# Patient Record
Sex: FEMALE | Race: WHITE | Employment: FULL TIME | URBAN - METROPOLITAN AREA
[De-identification: names, ages, dates, MRNs, and addresses within clinical notes are randomized per-mention and may not be internally consistent; named-entity substitution may affect disease eponyms.]

---

## 2021-08-22 ENCOUNTER — APPOINTMENT (OUTPATIENT)
Dept: GENERAL RADIOLOGY | Age: 66
End: 2021-08-22
Payer: COMMERCIAL

## 2021-08-22 ENCOUNTER — APPOINTMENT (OUTPATIENT)
Dept: CT IMAGING | Age: 66
End: 2021-08-22
Payer: COMMERCIAL

## 2021-08-22 ENCOUNTER — HOSPITAL ENCOUNTER (OUTPATIENT)
Age: 66
Setting detail: OBSERVATION
Discharge: HOME | End: 2021-08-23
Attending: INTERNAL MEDICINE | Admitting: INTERNAL MEDICINE
Payer: COMMERCIAL

## 2021-08-22 DIAGNOSIS — Z78.9 ALCOHOL USE: ICD-10-CM

## 2021-08-22 DIAGNOSIS — N17.9 AKI (ACUTE KIDNEY INJURY) (HCC): ICD-10-CM

## 2021-08-22 DIAGNOSIS — S82.831A OTHER CLOSED FRACTURE OF DISTAL END OF RIGHT FIBULA, INITIAL ENCOUNTER: Primary | ICD-10-CM

## 2021-08-22 DIAGNOSIS — R79.89 ELEVATED LACTIC ACID LEVEL: ICD-10-CM

## 2021-08-22 DIAGNOSIS — R55 SYNCOPE, UNSPECIFIED SYNCOPE TYPE: ICD-10-CM

## 2021-08-22 PROBLEM — S82.401A RIGHT FIBULAR FRACTURE: Status: ACTIVE | Noted: 2021-08-22

## 2021-08-22 PROBLEM — R10.9 ABDOMINAL PAIN: Status: ACTIVE | Noted: 2021-08-22

## 2021-08-22 PROBLEM — R74.8 ELEVATED LIVER ENZYMES: Status: ACTIVE | Noted: 2021-08-22

## 2021-08-22 LAB
ALBUMIN SERPL-MCNC: 4.4 G/DL (ref 3.5–4.6)
ALP BLD-CCNC: 81 U/L (ref 40–130)
ALT SERPL-CCNC: 43 U/L (ref 0–33)
ANION GAP SERPL CALCULATED.3IONS-SCNC: 17 MEQ/L (ref 9–15)
AST SERPL-CCNC: 86 U/L (ref 0–35)
BACTERIA: NEGATIVE /HPF
BASOPHILS ABSOLUTE: 0 K/UL (ref 0–0.2)
BASOPHILS RELATIVE PERCENT: 0.3 %
BILIRUB SERPL-MCNC: 0.4 MG/DL (ref 0.2–0.7)
BILIRUBIN URINE: NEGATIVE
BLOOD, URINE: NEGATIVE
BUN BLDV-MCNC: 35 MG/DL (ref 8–23)
CALCIUM SERPL-MCNC: 10.6 MG/DL (ref 8.5–9.9)
CHLORIDE BLD-SCNC: 99 MEQ/L (ref 95–107)
CLARITY: CLEAR
CO2: 20 MEQ/L (ref 20–31)
COLOR: YELLOW
CREAT SERPL-MCNC: 1.93 MG/DL (ref 0.5–0.9)
EOSINOPHILS ABSOLUTE: 0 K/UL (ref 0–0.7)
EOSINOPHILS RELATIVE PERCENT: 0.3 %
EPITHELIAL CELLS, UA: ABNORMAL /HPF (ref 0–5)
ETHANOL PERCENT: 0.01 G/DL
ETHANOL: 12 MG/DL (ref 0–0.08)
GFR AFRICAN AMERICAN: 31.5
GFR NON-AFRICAN AMERICAN: 26.1
GLOBULIN: 3 G/DL (ref 2.3–3.5)
GLUCOSE BLD-MCNC: 114 MG/DL (ref 70–99)
GLUCOSE URINE: NEGATIVE MG/DL
HCT VFR BLD CALC: 45.1 % (ref 37–47)
HEMOGLOBIN: 15.5 G/DL (ref 12–16)
HYALINE CASTS: ABNORMAL /LPF (ref 0–5)
KETONES, URINE: ABNORMAL MG/DL
LACTIC ACID: 1.2 MMOL/L (ref 0.5–2.2)
LACTIC ACID: 2.6 MMOL/L (ref 0.5–2.2)
LEUKOCYTE ESTERASE, URINE: NEGATIVE
LIPASE: 204 U/L (ref 12–95)
LYMPHOCYTES ABSOLUTE: 1.1 K/UL (ref 1–4.8)
LYMPHOCYTES RELATIVE PERCENT: 11 %
MAGNESIUM: 1.8 MG/DL (ref 1.7–2.4)
MCH RBC QN AUTO: 31.9 PG (ref 27–31.3)
MCHC RBC AUTO-ENTMCNC: 34.4 % (ref 33–37)
MCV RBC AUTO: 92.6 FL (ref 82–100)
MONOCYTES ABSOLUTE: 0.7 K/UL (ref 0.2–0.8)
MONOCYTES RELATIVE PERCENT: 6.8 %
NEUTROPHILS ABSOLUTE: 8 K/UL (ref 1.4–6.5)
NEUTROPHILS RELATIVE PERCENT: 81.6 %
NITRITE, URINE: NEGATIVE
PDW BLD-RTO: 12.8 % (ref 11.5–14.5)
PH UA: 5.5 (ref 5–9)
PLATELET # BLD: 177 K/UL (ref 130–400)
POTASSIUM SERPL-SCNC: 4.2 MEQ/L (ref 3.4–4.9)
PROTEIN UA: 30 MG/DL
RBC # BLD: 4.87 M/UL (ref 4.2–5.4)
RBC UA: ABNORMAL /HPF (ref 0–5)
RENAL EPITHELIAL, UA: ABNORMAL /HPF
SODIUM BLD-SCNC: 136 MEQ/L (ref 135–144)
SPECIFIC GRAVITY UA: 1.02 (ref 1–1.03)
TOTAL PROTEIN: 7.4 G/DL (ref 6.3–8)
TROPONIN: <0.01 NG/ML (ref 0–0.01)
URINE REFLEX TO CULTURE: ABNORMAL
UROBILINOGEN, URINE: 0.2 E.U./DL
WBC # BLD: 9.8 K/UL (ref 4.8–10.8)
WBC UA: ABNORMAL /HPF (ref 0–5)

## 2021-08-22 PROCEDURE — G0378 HOSPITAL OBSERVATION PER HR: HCPCS

## 2021-08-22 PROCEDURE — 74176 CT ABD & PELVIS W/O CONTRAST: CPT

## 2021-08-22 PROCEDURE — 84484 ASSAY OF TROPONIN QUANT: CPT

## 2021-08-22 PROCEDURE — 96361 HYDRATE IV INFUSION ADD-ON: CPT

## 2021-08-22 PROCEDURE — 2580000003 HC RX 258: Performed by: PERSONAL EMERGENCY RESPONSE ATTENDANT

## 2021-08-22 PROCEDURE — 81001 URINALYSIS AUTO W/SCOPE: CPT

## 2021-08-22 PROCEDURE — 73590 X-RAY EXAM OF LOWER LEG: CPT

## 2021-08-22 PROCEDURE — 82077 ASSAY SPEC XCP UR&BREATH IA: CPT

## 2021-08-22 PROCEDURE — 93005 ELECTROCARDIOGRAM TRACING: CPT | Performed by: PERSONAL EMERGENCY RESPONSE ATTENDANT

## 2021-08-22 PROCEDURE — 70450 CT HEAD/BRAIN W/O DYE: CPT

## 2021-08-22 PROCEDURE — 83690 ASSAY OF LIPASE: CPT

## 2021-08-22 PROCEDURE — 83605 ASSAY OF LACTIC ACID: CPT

## 2021-08-22 PROCEDURE — 83735 ASSAY OF MAGNESIUM: CPT

## 2021-08-22 PROCEDURE — 71045 X-RAY EXAM CHEST 1 VIEW: CPT

## 2021-08-22 PROCEDURE — 80053 COMPREHEN METABOLIC PANEL: CPT

## 2021-08-22 PROCEDURE — 99284 EMERGENCY DEPT VISIT MOD MDM: CPT

## 2021-08-22 PROCEDURE — 85025 COMPLETE CBC W/AUTO DIFF WBC: CPT

## 2021-08-22 PROCEDURE — 96360 HYDRATION IV INFUSION INIT: CPT

## 2021-08-22 PROCEDURE — 36415 COLL VENOUS BLD VENIPUNCTURE: CPT

## 2021-08-22 RX ORDER — SODIUM CHLORIDE 9 MG/ML
INJECTION, SOLUTION INTRAVENOUS CONTINUOUS
Status: DISCONTINUED | OUTPATIENT
Start: 2021-08-23 | End: 2021-08-23 | Stop reason: HOSPADM

## 2021-08-22 RX ORDER — SODIUM CHLORIDE 9 MG/ML
25 INJECTION, SOLUTION INTRAVENOUS PRN
Status: DISCONTINUED | OUTPATIENT
Start: 2021-08-22 | End: 2021-08-23 | Stop reason: HOSPADM

## 2021-08-22 RX ORDER — POLYETHYLENE GLYCOL 3350 17 G/17G
17 POWDER, FOR SOLUTION ORAL DAILY PRN
Status: DISCONTINUED | OUTPATIENT
Start: 2021-08-22 | End: 2021-08-23 | Stop reason: HOSPADM

## 2021-08-22 RX ORDER — SODIUM CHLORIDE 0.9 % (FLUSH) 0.9 %
5-40 SYRINGE (ML) INJECTION PRN
Status: DISCONTINUED | OUTPATIENT
Start: 2021-08-22 | End: 2021-08-23 | Stop reason: HOSPADM

## 2021-08-22 RX ORDER — ACETAMINOPHEN 650 MG/1
650 SUPPOSITORY RECTAL EVERY 6 HOURS PRN
Status: DISCONTINUED | OUTPATIENT
Start: 2021-08-22 | End: 2021-08-23 | Stop reason: HOSPADM

## 2021-08-22 RX ORDER — ONDANSETRON 4 MG/1
4 TABLET, ORALLY DISINTEGRATING ORAL EVERY 8 HOURS PRN
Status: DISCONTINUED | OUTPATIENT
Start: 2021-08-22 | End: 2021-08-23 | Stop reason: HOSPADM

## 2021-08-22 RX ORDER — ONDANSETRON 2 MG/ML
4 INJECTION INTRAMUSCULAR; INTRAVENOUS EVERY 6 HOURS PRN
Status: DISCONTINUED | OUTPATIENT
Start: 2021-08-22 | End: 2021-08-23 | Stop reason: HOSPADM

## 2021-08-22 RX ORDER — LISINOPRIL AND HYDROCHLOROTHIAZIDE 12.5; 1 MG/1; MG/1
2 TABLET ORAL DAILY
COMMUNITY

## 2021-08-22 RX ORDER — ACETAMINOPHEN 325 MG/1
650 TABLET ORAL EVERY 6 HOURS PRN
Status: DISCONTINUED | OUTPATIENT
Start: 2021-08-22 | End: 2021-08-23 | Stop reason: HOSPADM

## 2021-08-22 RX ORDER — FLUOXETINE HYDROCHLORIDE 20 MG/1
20 CAPSULE ORAL DAILY
COMMUNITY

## 2021-08-22 RX ORDER — SODIUM CHLORIDE 0.9 % (FLUSH) 0.9 %
5-40 SYRINGE (ML) INJECTION EVERY 12 HOURS SCHEDULED
Status: DISCONTINUED | OUTPATIENT
Start: 2021-08-23 | End: 2021-08-23 | Stop reason: HOSPADM

## 2021-08-22 RX ORDER — 0.9 % SODIUM CHLORIDE 0.9 %
1000 INTRAVENOUS SOLUTION INTRAVENOUS ONCE
Status: COMPLETED | OUTPATIENT
Start: 2021-08-22 | End: 2021-08-22

## 2021-08-22 RX ADMIN — SODIUM CHLORIDE 1000 ML: 9 INJECTION, SOLUTION INTRAVENOUS at 20:24

## 2021-08-22 ASSESSMENT — ENCOUNTER SYMPTOMS
COLOR CHANGE: 0
SORE THROAT: 0
NAUSEA: 1
COUGH: 0
ABDOMINAL PAIN: 1
VOMITING: 0
SHORTNESS OF BREATH: 0
DIARRHEA: 1
RHINORRHEA: 0
BLOOD IN STOOL: 0

## 2021-08-22 ASSESSMENT — PAIN DESCRIPTION - PAIN TYPE: TYPE: ACUTE PAIN

## 2021-08-22 ASSESSMENT — PAIN DESCRIPTION - DESCRIPTORS: DESCRIPTORS: ACHING;DULL

## 2021-08-22 ASSESSMENT — PAIN DESCRIPTION - LOCATION: LOCATION: ABDOMEN;HEAD

## 2021-08-22 ASSESSMENT — PAIN SCALES - GENERAL
PAINLEVEL_OUTOF10: 5
PAINLEVEL_OUTOF10: 4

## 2021-08-22 ASSESSMENT — PAIN DESCRIPTION - FREQUENCY: FREQUENCY: CONTINUOUS

## 2021-08-22 NOTE — ED NOTES
Bed:  [facility]  Expected date: 8/22/21  Expected time:   Means of arrival:   Comments:  60year old female with upper abd pain, also got dizzy and passed out but did not hit her [id number], 76, nsr, 16, 99% ra     Rose León RN  08/22/21 1922

## 2021-08-22 NOTE — ED TRIAGE NOTES
Pt c/o intermittent ABD pain and a syncopal episode after working today in the yard, Pt is A&OX3, calm, ambulatory, afebrile, breathes are equal and unlabored, denies ABD pain at this time

## 2021-08-22 NOTE — ED PROVIDER NOTES
[facility] ED  eMERGENCY dEPARTMENT eNCOUnter      Pt Name: Shyam Corea  MRN: [id number]  [place] 1955  Date of evaluation: 8/22/2021  Provider: Rc Fortune, [address]    Shyam Corea is a 60 y.o. female with PMHx of Hep C, mood disorder, HTN presents to the emergency department with syncope. Pt says she working outside in the heat throughout the day drinking water. She went inside and started with intermittent upper abdominal pain associated with lightheaded and nausea. She felt nauseous, went to the bathroom to vomit, and had a syncopal episode landing on her back. Denies head injuries. Thinks LOC lasted a few seconds to 1 minute. Son heard her fall and checked on her. She had diarrhea afterwards, ambulatory to the bed with out pain. She no longer has abdominal pain. Denies fevers, chills, cough, CP, SOB, vomiting. Denies ETOH or drugs. No blood thinners. HPI    Nursing Notes were reviewed. REVIEW OF SYSTEMS       Review of Systems   Constitutional: Negative for appetite change, chills and fever. HENT: Negative for congestion, rhinorrhea and sore throat. Respiratory: Negative for cough and shortness of breath. Cardiovascular: Negative for chest pain. Gastrointestinal: Positive for abdominal pain, diarrhea and nausea. Negative for blood in stool and vomiting. Genitourinary: Negative for difficulty urinating. Musculoskeletal: Negative for neck stiffness. Skin: Negative for color change and rash. Neurological: Positive for syncope and light-headedness. Negative for dizziness, weakness, numbness and headaches. All other systems reviewed and are negative. PAST MEDICAL HISTORY     Past Medical History:   Diagnosis Date    Other specified episodic mood disorder     Unspecified viral hepatitis C without hepatic coma          SURGICAL HISTORY     History reviewed. No pertinent surgical history.       CURRENT MEDICATIONS Previous Medications    DICLOFENAC (VOLTAREN) 75 MG EC TABLET    Take 1 tablet by mouth 2 times daily (with meals) for 123 days. LISINOPRIL (PRINIVIL) 10 MG TABLET    Take 1 tablet by mouth 2 times daily. ALLERGIES     Patient has no known allergies. FAMILY HISTORY       Family History   Problem Relation Age of Onset    Stroke Mother     High Blood Pressure Mother     Heart Failure Mother     Colon Cancer Mother     Stroke Father     Prostate Cancer Father     Mult Sclerosis Sister     Diabetes Other           SOCIAL HISTORY       Social History     Socioeconomic History    Marital status: Single     Spouse name: None    Number of children: None    Years of education: None    Highest education level: None   Occupational History    Occupation: Registered Nurse   Tobacco Use    Smoking status: Former Smoker    Smokeless tobacco: Never Used   Substance and Sexual Activity    Alcohol use: Yes    Drug use: Never    Sexual activity: None   Other Topics Concern    None   Social History Narrative    None     Social Determinants of Health     Financial Resource Strain:     Difficulty of Paying Living Expenses:    Food Insecurity:     Worried About Running Out of Food in the Last Year:     [address] in the Last Year:    Transportation Needs:     Lack of Transportation (Medical):      Lack of Transportation (Non-Medical):    Physical Activity:     Days of Exercise per Week:     Minutes of Exercise per Session:    Stress:     Feeling of Stress :    Social Connections:     Frequency of Communication with Friends and Family:     Frequency of Social Gatherings with Friends and Family:     Attends Bahai Services:     Active Member of Clubs or Organizations:     Attends Club or Organization Meetings:     Marital Status:    Intimate Partner Violence:     Fear of Current or Ex-Partner:     Emotionally Abused:     Physically Abused:     Sexually Abused:          PHYSICAL EXAM         ED Triage Vitals [08/22/21 1923]   BP Temp Temp Source Pulse Resp SpO2 Height Weight   112/76 97.2 °F (36.2 °C) Oral 71 16 100 % 5' 8\" (1.727 m) 200 lb (90.7 kg)       Physical Exam  Constitutional:       Appearance: She is well-developed. HENT:      Head: Normocephalic and atraumatic. Eyes:      Conjunctiva/sclera: Conjunctivae normal.      Pupils: Pupils are equal, round, and reactive to light. Neck:      Trachea: No tracheal deviation. Cardiovascular:      Heart sounds: Normal heart sounds. Pulmonary:      Effort: Pulmonary effort is normal. No respiratory distress. Breath sounds: Normal breath sounds. No stridor. Abdominal:      General: Bowel sounds are normal. There is no distension. Palpations: Abdomen is soft. There is no mass. Tenderness: There is no abdominal tenderness. There is no guarding or rebound. Musculoskeletal:         General: Normal range of motion. Cervical back: Normal range of motion and neck supple. Skin:     General: Skin is warm and dry. Capillary Refill: Capillary refill takes less than 2 seconds. Findings: No rash. Neurological:      Mental Status: She is alert and oriented to person, place, and time. Deep Tendon Reflexes: Reflexes are normal and symmetric. Psychiatric:         Behavior: Behavior normal.         Thought Content:  Thought content normal.         Judgment: Judgment normal.         DIAGNOSTIC RESULTS     EKG:All EKG's are interpreted by the Emergency Department Physician who either signs or Co-signs this chart in the absence of a cardiologist.    Normal sinus rhythm, rate 69, normal intervals, normal axis, no ST segment changes    RADIOLOGY:   Non-plain film images such as CT, Ultrasound and MRI are read by theradiologist. Plain radiographic images are visualized and preliminarily interpreted by the emergency physician with the below findings:    Interpretation per theRadiologist below, if available at the time of this note:    XR CHEST PORTABLE    (Results Pending)   CT Head WO Contrast    (Results Pending)   XR TIBIA FIBULA RIGHT (2 VIEWS)    (Results Pending)   CT ABDOMEN PELVIS WO CONTRAST Additional Contrast? None    (Results Pending)           LABS:  Labs Reviewed   COMPREHENSIVE METABOLIC PANEL - Abnormal; Notable for the following components:       Result Value    Anion Gap 17 (*)     Glucose 114 (*)     BUN 35 (*)     CREATININE 1.93 (*)     GFR Non- 26.1 (*)     GFR  31.5 (*)     Calcium 10.6 (*)     ALT 43 (*)     AST 86 (*)     All other components within normal limits   CBC WITH AUTO DIFFERENTIAL - Abnormal; Notable for the following components:    MCH 31.9 (*)     Neutrophils Absolute 8.0 (*)     All other components within normal limits   LACTIC ACID, PLASMA - Abnormal; Notable for the following components:    Lactic Acid 2.6 (*)     All other components within normal limits   LIPASE - Abnormal; Notable for the following components:    Lipase 204 (*)     All other components within normal limits   MAGNESIUM   TROPONIN   ETHANOL   URINE RT REFLEX TO CULTURE   LACTIC ACID, PLASMA       All other labs were within normal range or not returned as of this dictation. EMERGENCY DEPARTMENT COURSE and DIFFERENTIAL DIAGNOSIS/MDM:   Vitals:    Vitals:    08/22/21 1923 08/22/21 2125   BP: 112/76 121/76   Pulse: 71 76   Resp: 16 16   Temp: 97.2 °F (36.2 °C)    TempSrc: Oral    SpO2: 100% 99%   Weight: 200 lb (90.7 kg)    Height: 5' 8\" (1.727 m)          MDM    Chest xray shows no acute process. Right tib-fib x-ray shows fracture of distal fibula. BUN 35, creatinine 1.93 (0.88 January 2020),  lactic acid 2.6, lipase 204, ALT 43, AST 86, ethanol 12. Pt given 1L IVF with repeat lactic acid ordered. CT head shows no acute process. CT abdomen and pelvis shows common bile duct dilated at 12 mm which may be postsurgical, prior cholecystectomy . Calcified granuloma in the liver.  Pt on reassessment states she feels much better. She has had no abdominal pain while in the ER, no further lightheadedness. CRITICAL CARE TIME   Total Critical Caretime was 0 minutes, excluding separately reportable procedures. There was a high probability of clinically significant/life threatening deterioration in the patient's condition which required my urgent intervention. Procedures    FINAL IMPRESSION      1. Other closed fracture of distal end of right fibula, initial encounter    2. Syncope, unspecified syncope type    3. NATHANIEL (acute kidney injury) ([facility] [address]    4. Alcohol use    5. Elevated lactic acid level          DISPOSITION/PLAN   DISPOSITION Decision To Admit 08/22/2021 09:56:37 PM      PATIENT REFERRED TO:  No follow-up provider specified. DISCHARGE MEDICATIONS:  New Prescriptions    No medications on file          (Please notethat portions of this note were completed with a voice recognition program.  Efforts were made to edit the dictations but occasionally words are mis-transcribed. )    ANUJ Muro (electronically signed)  Emergency Physician Assistant         Barbara Stevens, [place]  08/22/21 [address] Edd Ellison [place]  08/22/21 8735

## 2021-08-23 ENCOUNTER — APPOINTMENT (OUTPATIENT)
Dept: ULTRASOUND IMAGING | Age: 66
End: 2021-08-23
Payer: COMMERCIAL

## 2021-08-23 VITALS
TEMPERATURE: 97.7 F | WEIGHT: 200 LBS | OXYGEN SATURATION: 96 % | RESPIRATION RATE: 18 BRPM | DIASTOLIC BLOOD PRESSURE: 61 MMHG | SYSTOLIC BLOOD PRESSURE: 105 MMHG | BODY MASS INDEX: 30.31 KG/M2 | HEART RATE: 67 BPM | HEIGHT: 68 IN

## 2021-08-23 LAB
ALBUMIN SERPL-MCNC: 4 G/DL (ref 3.5–4.6)
ALP BLD-CCNC: 74 U/L (ref 40–130)
ALT SERPL-CCNC: 35 U/L (ref 0–33)
ANION GAP SERPL CALCULATED.3IONS-SCNC: 13 MEQ/L (ref 9–15)
AST SERPL-CCNC: 40 U/L (ref 0–35)
BASOPHILS ABSOLUTE: 0 K/UL (ref 0–0.2)
BASOPHILS RELATIVE PERCENT: 0.4 %
BILIRUB SERPL-MCNC: 0.4 MG/DL (ref 0.2–0.7)
BUN BLDV-MCNC: 37 MG/DL (ref 8–23)
CALCIUM SERPL-MCNC: 9.4 MG/DL (ref 8.5–9.9)
CHLORIDE BLD-SCNC: 103 MEQ/L (ref 95–107)
CO2: 23 MEQ/L (ref 20–31)
CREAT SERPL-MCNC: 1.48 MG/DL (ref 0.5–0.9)
EKG ATRIAL RATE: 65 BPM
EKG ATRIAL RATE: 69 BPM
EKG P AXIS: 68 DEGREES
EKG P AXIS: 74 DEGREES
EKG P-R INTERVAL: 178 MS
EKG P-R INTERVAL: 188 MS
EKG Q-T INTERVAL: 396 MS
EKG Q-T INTERVAL: 436 MS
EKG QRS DURATION: 70 MS
EKG QRS DURATION: 84 MS
EKG QTC CALCULATION (BAZETT): 424 MS
EKG QTC CALCULATION (BAZETT): 453 MS
EKG R AXIS: 47 DEGREES
EKG R AXIS: 76 DEGREES
EKG T AXIS: 38 DEGREES
EKG T AXIS: 68 DEGREES
EKG VENTRICULAR RATE: 65 BPM
EKG VENTRICULAR RATE: 69 BPM
EOSINOPHILS ABSOLUTE: 0.1 K/UL (ref 0–0.7)
EOSINOPHILS RELATIVE PERCENT: 0.8 %
GFR AFRICAN AMERICAN: 29
GFR AFRICAN AMERICAN: 42.8
GFR NON-AFRICAN AMERICAN: 24
GFR NON-AFRICAN AMERICAN: 35.4
GLOBULIN: 2.4 G/DL (ref 2.3–3.5)
GLUCOSE BLD-MCNC: 92 MG/DL (ref 70–99)
HAV IGM SER IA-ACNC: ABNORMAL
HCT VFR BLD CALC: 41.9 % (ref 37–47)
HEMOGLOBIN: 14 G/DL (ref 12–16)
HEPATITIS B CORE IGM ANTIBODY: ABNORMAL
HEPATITIS B SURFACE ANTIGEN INTERPRETATION: ABNORMAL
HEPATITIS C ANTIBODY INTERPRETATION: REACTIVE
HEPATITIS INTERPRETATION:: ABNORMAL
LYMPHOCYTES ABSOLUTE: 1.9 K/UL (ref 1–4.8)
LYMPHOCYTES RELATIVE PERCENT: 24.3 %
MCH RBC QN AUTO: 31.4 PG (ref 27–31.3)
MCHC RBC AUTO-ENTMCNC: 33.5 % (ref 33–37)
MCV RBC AUTO: 94 FL (ref 82–100)
MONOCYTES ABSOLUTE: 1 K/UL (ref 0.2–0.8)
MONOCYTES RELATIVE PERCENT: 12.7 %
NEUTROPHILS ABSOLUTE: 4.9 K/UL (ref 1.4–6.5)
NEUTROPHILS RELATIVE PERCENT: 61.8 %
PDW BLD-RTO: 12.8 % (ref 11.5–14.5)
PERFORMED ON: ABNORMAL
PLATELET # BLD: 186 K/UL (ref 130–400)
POC CREATININE: 2.1 MG/DL (ref 0.6–1.2)
POC SAMPLE TYPE: ABNORMAL
POTASSIUM REFLEX MAGNESIUM: 3.6 MEQ/L (ref 3.4–4.9)
RBC # BLD: 4.46 M/UL (ref 4.2–5.4)
SODIUM BLD-SCNC: 139 MEQ/L (ref 135–144)
TOTAL PROTEIN: 6.4 G/DL (ref 6.3–8)
TROPONIN: <0.01 NG/ML (ref 0–0.01)
TROPONIN: <0.01 NG/ML (ref 0–0.01)
WBC # BLD: 8 K/UL (ref 4.8–10.8)

## 2021-08-23 PROCEDURE — 97162 PT EVAL MOD COMPLEX 30 MIN: CPT

## 2021-08-23 PROCEDURE — 76705 ECHO EXAM OF ABDOMEN: CPT

## 2021-08-23 PROCEDURE — 85025 COMPLETE CBC W/AUTO DIFF WBC: CPT

## 2021-08-23 PROCEDURE — 93010 ELECTROCARDIOGRAM REPORT: CPT | Performed by: INTERNAL MEDICINE

## 2021-08-23 PROCEDURE — 97166 OT EVAL MOD COMPLEX 45 MIN: CPT

## 2021-08-23 PROCEDURE — 84484 ASSAY OF TROPONIN QUANT: CPT

## 2021-08-23 PROCEDURE — 36415 COLL VENOUS BLD VENIPUNCTURE: CPT

## 2021-08-23 PROCEDURE — 80053 COMPREHEN METABOLIC PANEL: CPT

## 2021-08-23 PROCEDURE — 6370000000 HC RX 637 (ALT 250 FOR IP): Performed by: NURSE PRACTITIONER

## 2021-08-23 PROCEDURE — G0378 HOSPITAL OBSERVATION PER HR: HCPCS

## 2021-08-23 PROCEDURE — 2580000003 HC RX 258: Performed by: NURSE PRACTITIONER

## 2021-08-23 PROCEDURE — 80074 ACUTE HEPATITIS PANEL: CPT

## 2021-08-23 RX ORDER — DEXTROAMPHETAMINE SACCHARATE, AMPHETAMINE ASPARTATE MONOHYDRATE, DEXTROAMPHETAMINE SULFATE AND AMPHETAMINE SULFATE 5; 5; 5; 5 MG/1; MG/1; MG/1; MG/1
20 CAPSULE, EXTENDED RELEASE ORAL DAILY PRN
COMMUNITY
Start: 2021-07-15

## 2021-08-23 RX ORDER — MULTIVITAMIN WITH IRON
500 TABLET ORAL PRN
COMMUNITY

## 2021-08-23 RX ORDER — OMEGA-3 FATTY ACIDS/FISH OIL 300-1000MG
1-2 CAPSULE ORAL PRN
COMMUNITY

## 2021-08-23 RX ADMIN — IBUPROFEN 600 MG: 200 TABLET, FILM COATED ORAL at 03:04

## 2021-08-23 RX ADMIN — SODIUM CHLORIDE: 9 INJECTION, SOLUTION INTRAVENOUS at 02:05

## 2021-08-23 ASSESSMENT — PAIN DESCRIPTION - DESCRIPTORS: DESCRIPTORS: ACHING

## 2021-08-23 ASSESSMENT — PAIN DESCRIPTION - ORIENTATION
ORIENTATION: RIGHT
ORIENTATION: RIGHT

## 2021-08-23 ASSESSMENT — ENCOUNTER SYMPTOMS
SHORTNESS OF BREATH: 0
COUGH: 0
DIARRHEA: 1
NAUSEA: 1
TROUBLE SWALLOWING: 0
CHEST TIGHTNESS: 0
WHEEZING: 0
VOMITING: 0
SORE THROAT: 0
ABDOMINAL PAIN: 1
CONSTIPATION: 0
EYES NEGATIVE: 1

## 2021-08-23 ASSESSMENT — PAIN DESCRIPTION - LOCATION
LOCATION: ANKLE
LOCATION: ANKLE

## 2021-08-23 ASSESSMENT — PAIN SCALES - GENERAL
PAINLEVEL_OUTOF10: 5
PAINLEVEL_OUTOF10: 2
PAINLEVEL_OUTOF10: 2

## 2021-08-23 ASSESSMENT — PAIN DESCRIPTION - PAIN TYPE: TYPE: ACUTE PAIN

## 2021-08-23 NOTE — PROGRESS NOTES
Physical Therapy  Facility/Department: [facility] GUVY I043/D765-05      PT evaluation attempted 8/23/21, at 12:10 PM EDT, however this patient status is currently observation. Per facility protocol, PT evaluation and treatment orders for patients in observation status must include a PT specific diagnosis (i.e. \"difficulty ambulating\", \"lack of coordination\", etc.) These order specifications may be added to the \"comments\" section of the PT evaluation and treatment order. Requested updated Physical Therapy orders from referring provider via \"sticky note\". Coordination team notified.      We thank you for your referral!    [facility]) Physical Therapy Department    Electronically signed by Heidi Perla PT on 8/23/21 at 12:10 PM EDT

## 2021-08-23 NOTE — PROGRESS NOTES
Patient is alert and oriented. She has mild pain in her right lower leg and a headache. Right leg is in an air cast and leg is swollen and slightly bruised. Patient fell at home and believes she hit head also. There is bruising and swelling above her right eye. Patient has no neuro deficits and does not complain of any dizziness at this time.

## 2021-08-23 NOTE — H&P
[facility]  MEDICINE    HISTORY AND PHYSICAL EXAM    PATIENT NAME:  Geovani Wetzel    MRN:  [id number]  SERVICE DATE:  8/22/2021   SERVICE TIME:  11:33 PM    Primary Care Physician: Eboni Wolff MD     SUBJECTIVE  CHIEF COMPLAINT:  Syncopal episode    HPI:  Geovani Wetzel is a 60 y.o., , female who  has a past medical history of Other specified episodic mood disorder and Unspecified viral hepatitis C without hepatic coma. that is hospitalized for syncope, fracture right fibula, NATHANIEL. HPI   Presents after syncopal episode. Describes working outside all day, came in to house,  \"chugged a water, took a shower\"   Had sudden onset upper abdominal pain - \"felt like a belt squeezing me so hard\"  Accompanied by nausea and lightheadedness. She felt she would vomit, walked into bathroom and passed out. She reports one episode diarrhea at that time as well. She fell onto her back, did not hit head. She had xray here that showed fracture of right distal end of fibula. She feels she hydrates adequately but admits to being very hot working outside,  Did have coffee in AM and alcohol last PM.   She is admitted for further eval and treatment of syncope, NATHANIEL, fracture. PAST MEDICAL HISTORY:    Past Medical History:   Diagnosis Date    Other specified episodic mood disorder     Unspecified viral hepatitis C without hepatic coma      PAST SURGICAL HISTORY:  History reviewed. No pertinent surgical history.   FAMILY HISTORY:    Family History   Problem Relation Age of Onset    Stroke Mother     High Blood Pressure Mother     Heart Failure Mother     Colon Cancer Mother     Stroke Father     Prostate Cancer Father     Mult Sclerosis Sister     Diabetes Other      SOCIAL HISTORY:    Social History     Socioeconomic History    Marital status: Single     Spouse name: Not on file    Number of children: Not on file    Years of education: Not on file    Highest education level: Not on file Occupational History    Occupation: Registered Nurse   Tobacco Use    Smoking status: Former Smoker    Smokeless tobacco: Never Used   Substance and Sexual Activity    Alcohol use: Yes    Drug use: Never    Sexual activity: Not on file   Other Topics Concern    Not on file   Social History Narrative    Not on file     Social Determinants of Health     Financial Resource Strain:     Difficulty of Paying Living Expenses:    Food Insecurity:     Worried About Running Out of Food in the Last Year:     [address] in the Last Year:    Transportation Needs:     Lack of Transportation (Medical):  Lack of Transportation (Non-Medical):    Physical Activity:     Days of Exercise per Week:     Minutes of Exercise per Session:    Stress:     Feeling of Stress :    Social Connections:     Frequency of Communication with Friends and Family:     Frequency of Social Gatherings with Friends and Family:     Attends Hoahaoism Services:     Active Member of Clubs or Organizations:     Attends Club or Organization Meetings:     Marital Status:    Intimate Partner Violence:     Fear of Current or Ex-Partner:     Emotionally Abused:     Physically Abused:     Sexually Abused:      MEDICATIONS:   Prior to Admission medications    Medication Sig Start Date End Date Taking? Authorizing Provider   lisinopril (PRINIVIL) 10 MG tablet Take 1 tablet by mouth 2 times daily. 2/14/13 2/14/14  Manny Lebron MD   diclofenac (VOLTAREN) 75 MG EC tablet Take 1 tablet by mouth 2 times daily (with meals) for 123 days. 7/1/12 11/1/12  Manny Lebron MD       ALLERGIES: Patient has no known allergies. REVIEW OF SYSTEM:   Review of Systems   Constitutional: Negative for chills, diaphoresis, fatigue and fever. HENT: Negative for sore throat and trouble swallowing. Eyes: Negative. Respiratory: Negative for cough, chest tightness, shortness of breath and wheezing.     Cardiovascular: Negative for chest pain, palpitations and leg swelling. Gastrointestinal: Positive for abdominal pain, diarrhea and nausea. Negative for constipation and vomiting. Endocrine: Negative. Genitourinary: Negative for dysuria, flank pain and urgency. Musculoskeletal: Negative. Right ankle pain   Skin: Negative. Neurological: Positive for syncope and light-headedness. Negative for dizziness, speech difficulty, weakness, numbness and headaches. Psychiatric/Behavioral: Negative. Negative for confusion. The patient is not nervous/anxious. OBJECTIVE  PHYSICAL EXAM:   Physical Exam  Vitals and nursing note reviewed. Constitutional:       General: She is awake. She is not in acute distress. Appearance: Normal appearance. She is obese. She is not ill-appearing or diaphoretic. HENT:      Head: Normocephalic and atraumatic. Nose: Nose normal.      Mouth/Throat:      Pharynx: Oropharynx is clear. Eyes:      Conjunctiva/sclera: Conjunctivae normal.   Neck:      Vascular: No carotid bruit. Cardiovascular:      Rate and Rhythm: Normal rate and regular rhythm. Pulses: Normal pulses. Heart sounds: Normal heart sounds. No murmur heard. Pulmonary:      Effort: Pulmonary effort is normal.      Breath sounds: Normal breath sounds. No wheezing or rhonchi. Abdominal:      General: Abdomen is flat. Bowel sounds are normal.      Palpations: Abdomen is soft. Tenderness: There is no abdominal tenderness. Musculoskeletal:         General: Normal range of motion. Cervical back: No muscular tenderness. Right lower leg: No edema. Left lower leg: No edema. Lymphadenopathy:      Cervical: No cervical adenopathy. Skin:     General: Skin is warm and dry. Capillary Refill: Capillary refill takes less than 2 seconds. Neurological:      Mental Status: She is alert and oriented to person, place, and time.    Psychiatric:         Mood and Affect: Mood normal.         Behavior: Behavior normal. Behavior is cooperative. /67   Pulse 70   Temp 97.7 °F (36.5 °C)   Resp 18   Ht 5' 8\" (1.727 m)   Wt 200 lb (90.7 kg)   LMP  (LMP Unknown)   SpO2 96%   BMI 30.41 kg/m²     DATA:     Diagnostic tests reviewed for today's visit:    Most recent labs and imaging results reviewed.      LABS:    Recent Results (from the past 24 hour(s))   Comprehensive Metabolic Panel    Collection Time: 08/22/21  7:45 PM   Result Value Ref Range    Sodium 136 135 - 144 mEq/L    Potassium 4.2 3.4 - 4.9 mEq/L    Chloride 99 95 - 107 mEq/L    CO2 20 20 - 31 mEq/L    Anion Gap 17 (H) 9 - 15 mEq/L    Glucose 114 (H) 70 - 99 mg/dL    BUN 35 (H) 8 - 23 mg/dL    CREATININE 1.93 (H) 0.50 - 0.90 mg/dL    GFR Non-African American 26.1 (L) >60    GFR  31.5 (L) >60    Calcium 10.6 (H) 8.5 - 9.9 mg/dL    Total Protein 7.4 6.3 - 8.0 g/dL    Albumin 4.4 3.5 - 4.6 g/dL    Total Bilirubin 0.4 0.2 - 0.7 mg/dL    Alkaline Phosphatase 81 40 - 130 U/L    ALT 43 (H) 0 - 33 U/L    AST 86 (H) 0 - 35 U/L    Globulin 3.0 2.3 - 3.5 g/dL   CBC Auto Differential    Collection Time: 08/22/21  7:45 PM   Result Value Ref Range    WBC 9.8 4.8 - 10.8 K/uL    RBC 4.87 4.20 - 5.40 M/uL    Hemoglobin 15.5 12.0 - 16.0 g/dL    Hematocrit 45.1 37.0 - 47.0 %    MCV 92.6 82.0 - 100.0 fL    MCH 31.9 (H) 27.0 - 31.3 pg    MCHC 34.4 33.0 - 37.0 %    RDW 12.8 11.5 - 14.5 %    Platelets 061 414 - 425 K/uL    Neutrophils % 81.6 %    Lymphocytes % 11.0 %    Monocytes % 6.8 %    Eosinophils % 0.3 %    Basophils % 0.3 %    Neutrophils Absolute 8.0 (H) 1.4 - 6.5 K/uL    Lymphocytes Absolute 1.1 1.0 - 4.8 K/uL    Monocytes Absolute 0.7 0.2 - 0.8 K/uL    Eosinophils Absolute 0.0 0.0 - 0.7 K/uL    Basophils Absolute 0.0 0.0 - 0.2 K/uL   Lactic Acid, Plasma    Collection Time: 08/22/21  7:45 PM   Result Value Ref Range    Lactic Acid 2.6 (H) 0.5 - 2.2 mmol/L   Magnesium    Collection Time: 08/22/21  7:45 PM   Result Value Ref Range    Magnesium 1.8 1.7 - 2.4 mg/dL   Troponin    Collection Time: 08/22/21  7:45 PM   Result Value Ref Range    Troponin <0.010 0.000 - 0.010 ng/mL   Ethanol    Collection Time: 08/22/21  7:45 PM   Result Value Ref Range    Ethanol Lvl 12 mg/dL    Ethanol percent 0.011 G/dL   Lipase    Collection Time: 08/22/21  7:45 PM   Result Value Ref Range    Lipase 204 (H) 12 - 95 U/L   EKG 12 Lead - Chest Pain    Collection Time: 08/22/21  7:53 PM   Result Value Ref Range    Ventricular Rate 69 BPM    Atrial Rate 69 BPM    P-R Interval 188 ms    QRS Duration 70 ms    Q-T Interval 396 ms    QTc Calculation (Bazett) 424 ms    P Axis 68 degrees    R Axis 47 degrees    T Axis 38 degrees   Urine Reflex to Culture    Collection Time: 08/22/21  9:00 PM    Specimen: Urine, clean catch   Result Value Ref Range    Color, UA Yellow Straw/Yellow    Clarity, UA Clear Clear    Glucose, Ur Negative Negative mg/dL    Bilirubin Urine Negative Negative    Ketones, Urine TRACE (A) Negative mg/dL    Specific Gravity, UA 1.019 1.005 - 1.030    Blood, Urine Negative Negative    pH, UA 5.5 5.0 - 9.0    Protein, UA 30 (A) Negative mg/dL    Urobilinogen, Urine 0.2 <2.0 E.U./dL    Nitrite, Urine Negative Negative    Leukocyte Esterase, Urine Negative Negative    Urine Reflex to Culture Not Indicated    Lactic Acid, Plasma    Collection Time: 08/22/21  9:00 PM   Result Value Ref Range    Lactic Acid 1.2 0.5 - 2.2 mmol/L   Microscopic Urinalysis    Collection Time: 08/22/21  9:00 PM   Result Value Ref Range    Hyaline Casts, UA 5-10 (A) 0 - 5 /LPF    Renal Epithelial, UA 0-2 (A) /HPF    Bacteria, UA Negative Negative /HPF    WBC, UA 6-9 (A) 0 - 5 /HPF    RBC, UA 3-5 (A) 0 - 5 /HPF    Epithelial Cells, UA 3-5 0 - 5 /HPF       IMAGING:  No results found. VTE Prophylaxis: low molecular weight heparin -  start    ASSESSMENT AND PLAN  Principal Problem:    Syncope and collapse   As above, worked outside, overheated, likely dehydrated.    Syncope resulted in distal fib fracture on right. No HA, dizziness. Reported loss of consciousness < 1 min. NATHANIEL. Plan: admit  Hydrate. Active Problems:    NATHANIEL   Likely result of dehydration given overheated working outside, poor PO intake, caffeine, HCTZ,  ETOH. Plan: hydrate   Monitor labs  Consult nephrology if no improvement. Evaluate choice of antihypertensive. Essential hypertension, benign  Takes lisinopril/hctz   /76  No HA, blurred vision dizziness. Had syncope earlier today most likely related to dehydraton. Plan: monitor   Restart meds as tolerated  Will hold for now. Right fibular fracture  After fall in bathroom   Mild to mod pain at joint    Toes warm,  Good pulse. splint in place. Plan: ortho consult. Abdominal pain  Sudden onset  She describes as severe - like a belt squeezing tightly around upper abdomen. Had nausea, no vomiting. Plan: liver US.  (s/p amita)     Elevated liver enzymes   ALT 43, AST 86   Lipase 204. CT abdomen - report pending but reportedly no suspicion of pancreatitis. Plan: hepatitis panel,  US liver.          Plan of care discussed with: patient    SIGNATURE: ARCHANA Rodriguez - CNP  DATE: August 22, 2021  TIME: 11:33 PM   Panda Gonzalez MD  - supervising physician

## 2021-08-23 NOTE — PROGRESS NOTES
Physical Therapy Med Surg Initial Assessment  Facility/Department: [facility]  Room: [id number]       NAME: Brittani Wise  : 1955 (60 y.o.)  MRN: [id number]  CODE STATUS: Full Code    Date of Service: 2021    Patient Diagnosis(es): Syncope and collapse [R55]  NATHANIEL (acute kidney injury) ([facility] [address] [N17.9]  Elevated lactic acid level [R79.89]  Syncope, unspecified syncope type [R55]  Other closed fracture of distal end of right fibula, initial encounter [S82.831A]  Alcohol use [Z72.89]   Chief Complaint   Patient presents with    Loss of Consciousness     pt c/o a syncopal episode and ABD pain after working the yard today     Patient Active Problem List    Diagnosis Date Noted    Syncope and collapse 2021    Right fibular fracture 2021    Elevated liver enzymes 2021    Abdominal pain 2021    NATHANIEL (acute kidney injury) ([facility] [address] 2021    Patient overweight 2012    Fatigue 2012    Hep C w/o coma, chronic ([facility] [address] 2012    Hepatitis C 2011    Essential hypertension, benign 2011    Esophageal reflux 2011        Past Medical History:   Diagnosis Date    GERD (gastroesophageal reflux disease)     Hypertension     Other specified episodic mood disorder     Unspecified viral hepatitis C without hepatic coma      Past Surgical History:   Procedure Laterality Date    CHOLECYSTECTOMY      SKIN GRAFT      left arm/elbow       Chart Reviewed: Yes  Patient assessed for rehabilitation services?: Yes  Family / Caregiver Present: No    Restrictions:  Restrictions/Precautions: Weight Bearing, Fall Risk (high duarte score)  Lower Extremity Weight Bearing Restrictions  Right Lower Extremity Weight Bearing: Non Weight Bearing     SUBJECTIVE:      Pain  Pre Treatment Pain Screening  Pain at present: 2  Intervention List: Patient able to continue with treatment    Post Treatment Pain Screening:   Pain Screening  Patient Currently in Pain: Yes  Pain Assessment  Pain Level: 2  Pain Location: Ankle  Pain Orientation: Right    Prior Level of Function:  Social/Functional History  Lives With: Son (son can be home with pt; son in law)  Type of Home: House  Home Layout: Two level, Laundry in basement, Bed/Bath upstairs  Home Access: Stairs to enter with rails  Entrance Stairs - Number of Steps: 3 + 1  Entrance Stairs - Rails: Left  Bathroom Shower/Tub: Tub/Shower unit  ADL Assistance: Independent  Homemaking Assistance: Independent  Homemaking Responsibilities: Yes  Ambulation Assistance: Independent  Transfer Assistance: Independent  Active : Yes  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; clinicals 2xs/wk  [address]: watch tv, gardening    OBJECTIVE:   Vision: Within Functional Limits  Hearing: Within functional limits    Cognition:  Overall Orientation Status: Within Normal Limits  Follows Commands: Within Functional Limits    Observation/Palpation  Observation: Pt alert and attentive, splinted on RLE    ROM:  RLE AROM: Exceptions (WFL except R ankle with splint in place)  LLE AROM : WFL    Strength:  Strength RLE  Comment: grossly 4/5  Strength LLE  Comment: grossly 4/5    Neuro:  Balance  Posture: Fair  Sitting - Static: Good  Sitting - Dynamic: Good  Standing - Static: Fair;+ (mod increased sway with SLS L during hand washing without UE support)  Standing - Dynamic: Fair;- (requires B UE support for steadiness)     Tone RLE  RLE Tone: Normotonic  Tone LLE  LLE Tone: Normotonic  Motor Control  Gross Motor?: WFL  Sensation  Overall Sensation Status: WFL    Bed mobility  Supine to Sit: Supervision  Sit to Supine:  (NT- pt left up in bedside chair)    Transfers  Sit to Stand: Contact guard assistance  Stand to sit: Contact guard assistance  Comment: [employer]; max cues for technique with NWBing R LE    Ambulation  Ambulation?: Yes  Ambulation 1  Surface: level tile  Device: Rolling Walker  Assistance: Contact guard assistance  Quality of Gait: hopping gait pattern  Distance: 20 ft  Comments: pt unsteady; indep with current weight bearing status after cues    Stairs/Curb  Stairs?: No (pt edu in stair negotiation on buttocks- pt stated understanding)         Activity Tolerance  Activity Tolerance: Patient limited by endurance  Activity Tolerance: Pt limited by unsteadiness and new weight bearing status R LE          PT Education  PT Education: Goals;PT Role;Plan of Care;General Safety;Weight-bearing Education; Functional Mobility Training;Transfer Training;Gait Training    ASSESSMENT:   Body structures, Functions, Activity limitations: Decreased functional mobility ; Decreased balance;Decreased ROM; Decreased endurance; Increased pain;Decreased strength  Decision Making: Medium Complexity  History: med  Exam: med  Clinical Presentation: med    Prognosis: Good    DISCHARGE RECOMMENDATIONS:  Discharge Recommendations: Continue to assess pending progress    Assessment: Pt with new weight bearing status of NWBing R LE. Pt exhibits decreased strength and balance to safely complete NWBing R LE with indep at this time, requiring 1 person assistance for pt safety. Continued PT is required for safe d/c home and allow pt to go to upstairs bedroom and bathroom. REQUIRES PT FOLLOW UP: Yes      PLAN OF CARE:  Plan  Times per week: 3-6  Current Treatment Recommendations: Strengthening, Functional Mobility Training, Wheelchair Mobility Training, Neuromuscular Re-education, Home Exercise Program, Equipment Evaluation, Education, & procurement, ROM, Transfer Training, Gait Training, Safety Education & Training, Balance Training, Endurance Training, Stair training, Pain Management, Patient/Caregiver Education & Training, Positioning  Safety Devices  Type of devices: Call light within reach, Chair alarm in place, Left in chair    Goals:  Short term goals  Short term goal 1: Pt will demonstrate HEP indep.   Long term goals  Long term goal 1: Pt will demonstrate bed mobility indep for safe return home. Long term goal 2: Pt will demonstrate transfers mod indep with WW with 100% compliance with NWBing R LE to allow pt to use commode at home. Long term goal 3: Pt will demosntrate amb mod indep 50ft mod indep with WWwith 100% compliance with NWBing R LE to allow pt to amb to bathroom at home. Long term goal 4: Pt will demonstrate stair negotiation 1 flight maintaining NWBing R % of the time to allow pt to go upstairs to bedroom and bathroom  Long term goal 5: Pt will indep propel w/c 150ft to allow pt to demonstrate safe functional mobility inside her home. [facility] (6 CLICK) BASIC MOBILITY  AM-PAC Inpatient Mobility Raw Score : 16     Therapy Time:   Individual   Time In 1333   Time Out 1350   Minutes [address], 08/23/21 at 2:52 PM         Definitions for assistance levels  Independent = pt does not require any physical supervision or assistance from another person for activity completion. Device may be needed.   Stand by assistance = pt requires verbal cues or instructions from another person, close to but not touching, to perform the activity  Minimal assistance= pt performs 75% or more of the activity; assistance is required to complete the activity  Moderate assistance= pt performs 50% of the activity; assistance is required to complete the activity  Maximal assistance = pt performs 25% of the activity; assistance is required to complete the activity  Dependent = pt requires total physical assistance to accomplish the task

## 2021-08-23 NOTE — CARE COORDINATION
LSW discussed equipment with the pt and her son. They decided to purchase the walker today. LSW sent order for wheelchair and bed side commode to [employer] [facility] [employer] in [facility]. Those items will be delivered to the pt at her home. Pt has information

## 2021-08-23 NOTE — CONSULTS
Orthopaedic Surgery Consult Note    Chief Complaint   Patient presents with    Loss of Consciousness     pt c/o a syncopal episode and ABD pain after working the yard today      History of Present Illness: Aleta Baca is a 60 y.o. female with a history of hep C who presented to the ED last night after a syncopal fall. Patient was working in the heat for more than 5 hours. When she came back inside she drank water and took a shower. She was feeling dizzy. She started having abdominal pain in her upper abdomen under her breasts. Pain was so severe that she had to take her clothes off and go lie down. She started having nausea and on her way to the bathroom to throw up she passed out. She does not remember what happened. She woke up on the floor. She did not vomit and her nausea resolved. She was also experiencing severe pain in the right ankle with attempted ambulation. Imaging in the ED demonstrated a distal fibula fracture and orthopaedic surgery was consulted for further evaluation. She reports a previous right ankle injury that has left her ankle feeling unsteady with activity. Pain is improved at rest in the bed, denies any new onset numbness or tingling. Physical Exam:    General:  Alert and oriented x 3, NAD, well kempt, and of stated age. Vital Signs:  /61   Pulse 67   Temp 97.7 °F (36.5 °C) (Oral)   Resp 18   Ht 5' 8\" (1.727 m)   Wt 200 lb (90.7 kg)   LMP  (LMP Unknown)   SpO2 96%   BMI 30.41 kg/m²  , Body mass index is 30.41 kg/m². HEENT:  Head is normocephalic and atraumatic. Extraocular muscles are grossly intact. Neck:  Supple, no visible swelling. Cardiovascular:  Hemodynamically stable. Respiratory:  No audible wheezing, unlabored respirations. Skin:  Clean and dry, well kempt. Psychiatric:  Good affect, stable, cooperative  Right ankle: Skin intact with mild swelling. Tender to palpation at the distal fibula. No medial tenderness.  No tenderness at the fibular neck. Motor and sensory intact in the distally. Calf soft and compressible. Toes warm and well-perfused. Assessment/Plan: 60year-old female with right ankle fracture. 1. Recommend non-operative treatment at this time. Gonzalez C lateral malleolus fracture more commonly associated with concomitant ligamentous injury, and there is slight medial clear space widening although that may be residual from previous injury. Recommend stress radiographs in the office. 2. NWB  3. Ice, elevate  4. Will splint later this morning. 5. Follow-up in the orthopaedic clinic later this week. Thank you for this consultation and for allowing me to participate in the care of this patient. Past Medical History    Past Medical History:   Diagnosis Date    GERD (gastroesophageal reflux disease)     Hypertension     Other specified episodic mood disorder     Unspecified viral hepatitis C without hepatic coma        Past Surgical History    Past Surgical History:   Procedure Laterality Date    CHOLECYSTECTOMY      SKIN GRAFT      left arm/elbow       Allergies    Patient has no known allergies.     Medications      Current Facility-Administered Medications   Medication Dose Route Frequency Provider Last Rate Last Admin    sodium chloride flush 0.9 % injection 5-40 mL  5-40 mL Intravenous 2 times per day Dinah Basil, APRN - CNP        sodium chloride flush 0.9 % injection 5-40 mL  5-40 mL Intravenous PRN Dinah Basil, APRN - CNP        0.9 % sodium chloride infusion  25 mL Intravenous PRN Dinah Basil, APRN - CNP        enoxaparin (LOVENOX) injection 40 mg  40 mg Subcutaneous Daily Phillip Joyce APRN - CNP        ondansetron (ZOFRAN-ODT) disintegrating tablet 4 mg  4 mg Oral Q8H PRN Dinah Basil, APRN - CNP        Or    ondansetron [facility]) injection 4 mg  4 mg Intravenous Q6H PRN Dinah Basil, APRN - CNP        polyethylene glycol (GLYCOLAX) packet 17 g  17 g Oral Daily PRN Threasa Locks, APRN - CNP        acetaminophen (TYLENOL) tablet 650 mg  650 mg Oral Q6H PRN Threasa Locks, APRN - CNP        Or    acetaminophen (TYLENOL) suppository 650 mg  650 mg Rectal Q6H PRN Threasa Locks, APRN - CNP        0.9 % sodium chloride infusion   Intravenous Continuous Threasa Locks, APRN -  mL/hr at 08/23/21 0205 New Bag at 08/23/21 0205       Family History    Family History   Problem Relation Age of Onset    Stroke Mother     High Blood Pressure Mother     Heart Failure Mother     Colon Cancer Mother     Stroke Father     Prostate Cancer Father     Mult Sclerosis Sister     Diabetes Other        Social History:    Social History     Socioeconomic History    Marital status: Single     Spouse name: Not on file    Number of children: Not on file    Years of education: Not on file    Highest education level: Not on file   Occupational History    Occupation: Registered Nurse   Tobacco Use    Smoking status: Former Smoker    Smokeless tobacco: Never Used   Substance and Sexual Activity    Alcohol use: Yes    Drug use: Never    Sexual activity: Not on file   Other Topics Concern    Not on file   Social History Narrative    Not on file     Social Determinants of Health     Financial Resource Strain:     Difficulty of Paying Living Expenses:    Food Insecurity:     Worried About Running Out of Food in the Last Year:     [address] in the Last Year:    Transportation Needs:     Lack of Transportation (Medical):      Lack of Transportation (Non-Medical):    Physical Activity:     Days of Exercise per Week:     Minutes of Exercise per Session:    Stress:     Feeling of Stress :    Social Connections:     Frequency of Communication with Friends and Family:     Frequency of Social Gatherings with Friends and Family:     Attends Synagogue Services:     Active Member of Clubs or Organizations:     Attends Club or Organization Meetings:     Marital Status:    Intimate Partner Violence:     Fear of Current or Ex-Partner:     Emotionally Abused:     Physically Abused:     Sexually Abused:        Review of Systems:  A 14 point review of systems was completed, reviewed and placed in the patients chart. It is otherwise negative except for the pertinent information in the HPI. Constitutional: Negative for fever, appetite change and unexpected weight change. HENT: Negative for hearing loss, trouble swallowing and voice change. Eyes: Negative for redness and visual disturbance. Respiratory: Negative for cough and shortness of breath. Cardiovascular: Negative for chest pain and palpitations. Gastrointestinal: Negative for nausea, vomiting and blood in stool. Endocrine: Negative for cold intolerance and heat intolerance. Genitourinary: Negative for dysuria and hematuria. Musculoskeletal: See HPI   Skin: Negative for rash and wound. Neurological: Negative for dizziness, seizures and headaches. Hematological: Negative for adenopathy. Does not bruise/bleed easily. Psychiatric/Behavioral: Negative for behavioral problems and sleep disturbance. Test Results    CBC:   Recent Labs     08/22/21 1945 08/23/21  0350   WBC 9.8 8.0   HGB 15.5 14.0    186     BMP:    Recent Labs     08/22/21 1945 08/23/21  0350    139   K 4.2 3.6   CL 99 103   CO2 20 23   BUN 35* 37*   CREATININE 1.93* 1.48*   GLUCOSE 114* 92       EXAMINATION: XR TIBIA FIBULA RIGHT (2 VIEWS), 8/22/2021 8:33 PM        CLINICAL HISTORY:  fall, right knee pain        COMPARISON: Radiographs 5/23/2011       TECHNIQUE: 4 views of the right knee       FINDINGS:     There is a mildly displaced distal fibular diaphyseal fracture, with mild impaction. There is overlying soft tissue swelling.  Well-corticated ossific fragment at the fibular tip, remote injury.  The marginal joint spaces are preserved without significant  degenerative changes.             Impression   Mildly displaced right distal fibular diaphyseal fracture with mild impaction.           CT Head WO Contrast    Result Date: 8/23/2021  CT Brain. Contrast medium:  without contrast.. History:  Syncope. Technical factors: CT imaging of the brain was obtained and formatted as 5 mm contiguous axial images. 2.5 mm contiguous axial images were obtained through the osseous structures. Sagittal and coronal reconstruction obtained during postprocessing. Comparison:  None. Findings: Extra-axial spaces:  Normal. Intracranial hemorrhage:  None. Ventricular system: [Without anomaly.] Basal Cisterns:  Normal. Cerebral Parenchyma: [Without anomaly]. Midline Shift:  None. Cerebellum:  Normal. Paranasal sinuses and mastoid air cells:  Normal. Visualized Orbits:  Normal.     Impression: [Negative CT of the brain]. All CT scans at this facility use dose modulation, iterative reconstruction, and/or weight based dosing when appropriate to reduce radiation dose to as low as reasonably achievable. XR CHEST PORTABLE    Result Date: 8/23/2021  EXAMINATION: Portable AP ERECT view of the chest. CLINICAL HISTORY:  syncope , loss of consciousness DATE: 8/22/2021 8:33 PM COMPARISONS: 2/26/2013 FINDINGS: Normal cardiac silhouette. Lungs are clear without consolidation. No pleural effusion or pneumothorax. There are mild degenerative changes in spine. NO ACUTE CARDIOPULMONARY ABNORMALITY. NO CHANGE. US ABDOMEN LIMITED Specify organ? LIVER    Result Date: 8/23/2021  EXAMINATION: US ABDOMEN LIMITED CLINICAL HISTORY: ELEVATED LIVER FUNCTION TESTS FINDINGS: Liver normal in size, shape, and echogenicity. No intrahepatic ductal dilatation. Common duct measures 12 mm at level of portal vein and right hepatic artery. No intraluminal mass identified. Gallbladder surgically absent. Pancreatic head and body normal in size, shape, and echogenicity. Pancreatic tail obscured by overlying bowel gas. EXTRAHEPATIC DUCTAL DILATATION IN POSTCHOLECYSTECTOMY PATIENT. Problem List    Patient Active Problem List   Diagnosis    Essential hypertension, benign    Esophageal reflux    Hepatitis C    Patient overweight    Fatigue    Hep C w/o coma, chronic (HCC)    Syncope and collapse    Right fibular fracture    Elevated liver enzymes    Abdominal pain    NATHANIEL (acute kidney injury) ([facility] [address]          Note - This record has been created using [employer]. Chart creation errors have been sought, but may not always have been located. Such creation errors do not reflect on the standard of medical care.

## 2021-08-23 NOTE — PLAN OF CARE
See OT evaluation for all goals and OT POC.  Electronically signed by JACOB Jamil on 8/23/2021 at 2:16 PM

## 2021-08-23 NOTE — PROGRESS NOTES
Pain: Yes  Pain Assessment: 0-10  Pain Level: 2  Pain Type: Acute pain  Pain Location: Ankle  Pain Orientation: Right  Pain Descriptors: Aching       Prior Level of Function:  Social/Functional History  Lives With: Son (son can be home with pt; son in law)  Type of Home: House  Home Layout: Two level, Laundry in basement, Bed/Bath upstairs  Home Access: Stairs to enter with rails  Entrance Stairs - Number of Steps: 3 + 1  Entrance Stairs - Rails: Left  Bathroom Shower/Tub: Tub/Shower unit  ADL Assistance: Independent  Homemaking Assistance: Independent  Homemaking Responsibilities: Yes  Ambulation Assistance: Independent  Transfer Assistance: Independent  Active : Yes  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; clinicals 2xs/wk  Leisure & Hobbies: watch tv, gardening    OBJECTIVE:     Orientation Status:  Orientation  Overall Orientation Status: Within Functional Limits    Observation:  Observation/Palpation  Posture: Good  Observation: Pt alert and attentive, splinted on RLE    Cognition Status:  Cognition  Overall Cognitive Status: WNL    Perception Status:  Perception  Overall Perceptual Status: WFL    Sensation Status:  Sensation  Overall Sensation Status: WFL    Vision and Hearing Status:  Vision  Vision: Within Functional Limits  Hearing  Hearing: Within functional limits     ROM:   LUE AROM (degrees)  LUE AROM : WFL  Left Hand AROM (degrees)  Left Hand AROM: WFL  RUE AROM (degrees)  RUE AROM : WFL  Right Hand AROM (degrees)  Right Hand AROM: WFL  Right Hand General AROM: Except thumb at baseline    Strength:  LUE Strength  Gross LUE Strength: Exceptions to [facility]  L Hand General: 3+/5  LUE Strength Comment: 3+/5 all planes  RUE Strength  Gross RUE Strength: Exceptions to [facility]  R Hand General: 3+/5  RUE Strength Comment: 3+/5 all planes    Coordination, Tone, Quality of Movement:    Tone RUE  RUE Tone: Normotonic  Tone LUE  LUE Tone: Normotonic  Coordination  Movements Are Fluid And Coordinated: Yes  Quality of Movement Other  Comment: Has arthritis in hands, old R hand fx with impact to thumb, but functionally able to perform ADL tasks    Hand Dominance:  Hand Dominance  Hand Dominance: Right    ADL Status:  ADL  Feeding: Independent  Grooming: Supervision  UE Bathing: Supervision  LE Bathing: Stand by assistance  UE Dressing: Supervision  LE Dressing: Stand by assistance  Toileting: Stand by assistance  Additional Comments: Simulated ADLs as above. Pt. able to don sock on L foot as well as manage pants for toileting. Toilet Transfers  Toilet - Technique: Ambulating  Equipment Used: Grab bars  Toilet Transfer: Minimal assistance  Toilet Transfers Comments: Increased assist, difficulty pulling up from grab bar on L side       Therapy key for assistance levels -   Independent = Pt. is able to perform task with no assistance but may require a device   Stand by assistance = Pt. does not perform task at an independent level but does not need physical assistance, requires verbal cues  Minimal, Moderate, Maximal Assistance = Pt. requires physical assistance (25%, 50%, 75% assist from helper) for task but is able to actively participate in task   Dependent = Pt. requires total assistance with task and is not able to actively participate with task completion     Functional Mobility:  Functional Mobility  Functional - Mobility Device: Rolling Walker  Activity: To/from bathroom  Assist Level: Stand by assistance  Functional Mobility Comments: SBA; able to follow NWB well without assist.  Transfers  Sit to stand: Stand by assistance  Stand to sit: Stand by assistance  Transfer Comments: G understanding demo'd of NWB technique, increased effort for completion.     Bed Mobility  Bed mobility  Supine to Sit: Supervision (HOB elevated)  Comment: Up to chair at end of tx    Seated and Standing Balance:  Balance  Sitting Balance: Independent  Standing Balance: Supervision    Functional Endurance:  Activity Tolerance  Activity Tolerance: Patient Tolerated treatment well    D/C Recommendations:  OT D/C RECOMMENDATIONS  REQUIRES OT FOLLOW UP: Yes    Equipment Recommendations:  OT Equipment Recommendations  Equipment Needed: Yes  Mobility Devices: Wheelchair, ADL Assistive Devices, [place]: Rolling  Wheelchair: Standard  ADL Assistive Devices: Toileting - 3-in-1 Commode, Shower Chair with back    OT Education:   OT Education  OT Education: OT Role, Plan of Care  Patient Education: Educated pt. in transfer techniques and safe mobility for [employer] use. Barriers to Learning: None    OT Follow Up:  OT D/C RECOMMENDATIONS  REQUIRES OT FOLLOW UP: Yes       Assessment/Discharge Disposition:  Assessment: Pt is a 60year old woman from home with son who presents to [id number] [facility] with the above deficits s/p fall which impact her ability to perform ADLs and IADLs d/t NWB RLE. Pt. would benefit from continued OT to maximize independence and safety with ADL tasks.   Performance deficits / Impairments: Decreased functional mobility , Decreased ADL status, Decreased strength, Decreased endurance, Decreased balance, Decreased high-level IADLs  Prognosis: Good  Discharge Recommendations: Continue to assess pending progress  Decision Making: Medium Complexity  History: Pt's medical history is moderately complex  Exam: Pt. has 6 performance deficits  Assistance / Modification: Pt. requires SBA to CGA    Six Click Score   How much help for putting on and taking off regular lower body clothing?: A Little  How much help for Bathing?: A Little  How much help for Toileting?: A Little  How much help for putting on and taking off regular upper body clothing?: A Little  How much help for taking care of personal grooming?: A Little  How much help for eating meals?: None  AM-[facility] Inpatient Daily Activity Raw Score: 19  AM-PAC Inpatient ADL T-Scale Score : 40.22  ADL Inpatient CMS 0-100% Score: 42.8    Plan:  Plan  Times per week: 1-4x  Plan weeks: Length of acute stay  Current Treatment Recommendations: Strengthening, Balance Training, Functional Mobility Training, Endurance Training, Pain Management, Safety Education & Training, Patient/Caregiver Education & Training, Equipment Evaluation, Education, & procurement, Self-Care / ADL, Home Management Training, Positioning, Wheelchair Mobility Training    Goals:   Patient will:    - Improve functional endurance to tolerate/complete 30 mins of ADL's  - Be Mod I in UB ADLs   - Be Mod I in LB ADLs  - Be Mod I in ADL transfers without LOB  - Be Mod I in toileting tasks  - Improve B UE strength and endurance to 4/5 in order to participate in self-care activities as projected. - Access appropriate D/C site with as few architectural barriers as possible. - Sequence self-care tasks with no verbal cues for safety    Patient Goal: Patient goals : \"I want to get home\"     Discussed and agreed upon: Yes Comments:     Therapy Time:   OT Individual Minutes  Time In: 1332  Time Out: 1350  Minutes: 18    Eval: 18 minutes     Electronically signed by:     ERIN Alfaro/AURY, ERIN/L  8/23/2021, 2:15 PM

## 2021-08-24 NOTE — DISCHARGE SUMMARY
Discharge Summary    Date: 8/23/2021  Patient Name: Michelle Wolff YOB: 1955 Age: 60 y.o. Admit Date: 8/22/2021  Discharge Date: 8/23/2021  Discharge Condition: Good    Admission Diagnosis  Syncope and collapse (R55); NATHANIEL (acute kidney injury) ([facility] [address] (N17.9); Elevated lactic acid level (R79.89); Syncope, unspecified syncope type (R55); Other closed fracture of distal end of right fibula, initial encounter (L64.708E); Alcohol use (Z72.89)     Discharge Diagnosis  Principal Problem: Syncope and collapseActive Problems: Essential hypertension, benign Right fibular fracture Elevated liver enzymes Abdominal pain NATHANIEL (acute kidney injury) (HCC)Resolved Problems: * No resolved hospital problems. [facility] Stay  Narrative of Hospital Course:  Admitted for syncope and collapse, suspected secondary to dehydration while working in the sun for several hours. NATHANIEL on admission, improved s/p IV fluids. Found to also have distal right fibula fracture, for which orthopedics was consulted, splint was placed, plan for follow-up as an outpatient in 2 weeks. Troponins negative x2. Other labs within normal limits. Deemed appropriate for discharge home on 8/23/2021, with follow up as scheduled. Consultants:  IP CONSULT TO ORTHOPEDIC SURGERY    Surgeries/procedures Performed:       Treatments:            Discharge Plan/Disposition:  Home    Hospital/Incidental Findings Requiring Follow Up:    Patient Instructions:    Diet:    Activity:  For number of days (if applicable): Other Instructions:    Provider Follow-Up:   No follow-ups on file.      Significant Diagnostic Studies:    Recent Labs:  Admission on 08/22/2021, Discharged on 08/23/2021Sodium                                        Date: 08/22/2021Value: 136         Ref range: 135 - 144 mEq/L    Status: FinalPotassium                                     Date: 08/22/2021Value: 4.2         Ref range: 3.4 - 4.9 mEq/L    Status: Final              Comment: Specimen hemolysis has exceeded the interference as definedby Roche. Value may be falsely increased. Suggestrecollection if clinically indicated. Chloride                                      Date: 08/22/2021Value: 99          Ref range: 95 - 107 mEq/L     Status: FinalCO2                                           Date: 08/22/2021Value: 20          Ref range: 20 - 31 mEq/L      Status: FinalAnion Gap                                     Date: 08/22/2021Value: 17*         Ref range: 9 - 15 mEq/L       Status: FinalGlucose                                       Date: 08/22/2021Value: 114*        Ref range: 70 - 99 mg/dL      Status: FinalBUN                                           Date: 08/22/2021Value: 35*         Ref range: 8 - 23 mg/dL       Status: FinalCREATININE                                    Date: 08/22/2021Value: 1.93*       Ref range: 0.50 - 0.90 mg/dL  Status: FinalGFR Non-                      Date: 08/22/2021Value: 26.1*       Ref range: >60                Status: Final              Comment: >60 mL/min/1.73m2 EGFR, calc. for ages 25 and older using theMDRD formula (not corrected for weight), is valid for stablerenal function. GFR                           Date: 08/22/2021Value: 31.5*       Ref range: >60                Status: Final              Comment: >60 mL/min/1.73m2 EGFR, calc. for ages 25 and older using theMDRD formula (not corrected for weight), is valid for stablerenal function. Calcium                                       Date: 08/22/2021Value: 10.6*       Ref range: 8.5 - 9.9 mg/dL    Status: FinalTotal Protein                                 Date: 08/22/2021Value: 7.4         Ref range: 6.3 - 8.0 g/dL     Status: FinalAlbumin                                       Date: 08/22/2021Value: 4.4         Ref range: 3.5 - 4.6 g/dL     Status: FinalTotal Bilirubin                               Date: 08/22/2021Value: 0.4         Ref range: 0.2 - 0.7 mg/dL    Status: FinalAlkaline Phosphatase                          Date: 08/22/2021Value: 81          Ref range: 40 - 130 U/L       Status: FinalALT                                           Date: 08/22/2021Value: 43*         Ref range: 0 - 33 U/L         Status: Final              Comment: Specimen hemolysis has exceeded the interference as definedby Roche. Result may be affected. Suggest recollection ifclinically indicated. AST                                           Date: 08/22/2021Value: 80*         Ref range: 0 - 35 U/L         Status: Final              Comment: Specimen hemolysis has exceeded the interference as definedby Roche. Value may be falsely increased. Suggestrecollection if clinically indicated. Globulin                                      Date: 08/22/2021Value: 3.0         Ref range: 2.3 - 3.5 g/dL     Status: [facility]                                           Date: 08/22/2021Value: 9.8         Ref range: 4.8 - 10.8 K/uL    Status: FinalRBC                                           Date: 08/22/2021Value: 4.87        Ref range: 4.20 - 5.40 M/uL   Status: FinalHemoglobin                                    Date: 08/22/2021Value: 15.5        Ref range: 12.0 - 16.0 g/dL   Status: FinalHematocrit                                    Date: 08/22/2021Value: 45.1        Ref range: 37.0 - 47.0 %      Status: FinalMCV                                           Date: 08/22/2021Value: 92.6        Ref range: 82.0 - 100.0 fL    Status: [address]                                           Date: 08/22/2021Value: 31.9*       Ref range: 27.0 - 31.3 pg     Status: [address]                                          Date: 08/22/2021Value: 34.4        Ref range: 33.0 - 37.0 %      Status: FinalRDW                                           Date: 08/22/2021Value: 12.8        Ref range: 11.5 - 14.5 %      Status: FinalPlatelets                                     Date: 08/22/2021Value: 177         Ref range: 130 - 400 K/uL     Status: FinalNeutrophils %                                 Date: 08/22/2021Value: 81.6        Ref range: %                  Status: FinalLymphocytes %                                 Date: 08/22/2021Value: 11.0        Ref range: %                  Status: FinalMonocytes %                                   Date: 08/22/2021Value: 6.8         Ref range: %                  Status: FinalEosinophils %                                 Date: 08/22/2021Value: 0.3         Ref range: %                  Status: FinalBasophils %                                   Date: 08/22/2021Value: 0.3         Ref range: %                  Status: FinalNeutrophils Absolute                          Date: 08/22/2021Value: 8.0*        Ref range: 1.4 - 6.5 K/uL     Status: FinalLymphocytes Absolute                          Date: 08/22/2021Value: 1.1         Ref range: 1.0 - 4.8 K/uL     Status: FinalMonocytes Absolute                            Date: 08/22/2021Value: 0.7         Ref range: 0.2 - 0.8 K/uL     Status: FinalEosinophils Absolute                          Date: 08/22/2021Value: 0.0         Ref range: 0.0 - 0.7 K/uL     Status: FinalBasophils Absolute                            Date: 08/22/2021Value: 0.0         Ref range: 0.0 - 0.2 K/uL     Status: FinalLactic Acid                                   Date: 08/22/2021Value: 2.6*        Ref range: 0.5 - 2.2 mmol/L   Status: FinalMagnesium                                     Date: 08/22/2021Value: 1.8         Ref range: 1.7 - 2.4 mg/dL    Status: FinalTroponin                                      Date: 08/22/2021Value: <0.010      Ref range: 0.000 - 0.010 ng*  Status: Final              Comment: Methodology by Troponin T.Ethanol Lvl                                   Date: 08/22/2021Value: 12          Ref range: mg/dL              Status: FinalEthanol percent                               Date: 08/22/2021Value: 0.011       Ref range: G/dL               Status: FinalLipase Date: 08/22/2021Value: 204*        Ref range: 12 - 95 U/L        Status: FinalVentricular Rate                              Date: 08/22/2021Value: 69          Ref range: BPM                Status: FinalAtrial Rate                                   Date: 08/22/2021Value: 69          Ref range: BPM                Status: FinalP-R Interval                                  Date: 08/22/2021Value: 188         Ref range: ms                 Status: FinalQRS Duration                                  Date: 08/22/2021Value: 70          Ref range: ms                 Status: FinalQ-T Interval                                  Date: 08/22/2021Value: 396         Ref range: ms                 Status: FinalQTc Calculation (Bazett)                      Date: 08/22/2021Value: 424         Ref range: ms                 Status: FinalP Axis                                        Date: 08/22/2021Value: 68          Ref range: degrees            Status: FinalR Axis                                        Date: 08/22/2021Value: 47          Ref range: degrees            Status: FinalT Axis                                        Date: 08/22/2021Value: 38          Ref range: degrees            Status: FinalColor, UA                                     Date: 08/22/2021Value: Yellow      Ref range: Straw/Yellow       Status: FinalClarity, UA                                   Date: 08/22/2021Value: Clear       Ref range: Clear              Status: FinalGlucose, Ur                                   Date: 08/22/2021Value: Negative    Ref range: Negative mg/dL     Status: FinalBilirubin Urine                               Date: 08/22/2021Value: Negative    Ref range: Negative           Status: FinalKetones, Urine                                Date: 08/22/2021Value: TRACE*      Ref range: Negative mg/dL     Status: FinalSpecific Gravity, UA                          Date: 08/22/2021Value: 1.019       Ref range: 1.005 - 1.030      Status: FinalBlood, Urine                                  Date: 08/22/2021Value: Negative    Ref range: Negative           Status: FinalpH, UA                                        Date: 08/22/2021Value: 5.5         Ref range: 5.0 - 9.0          Status: FinalProtein, UA                                   Date: 08/22/2021Value: 30*         Ref range: Negative mg/dL     Status: FinalUrobilinogen, Urine                           Date: 08/22/2021Value: 0.2         Ref range: <2.0 E.U./dL       Status: FinalNitrite, Urine                                Date: 08/22/2021Value: Negative    Ref range: Negative           Status: FinalLeukocyte Esterase, Urine                     Date: 08/22/2021Value: Negative    Ref range: Negative           Status: FinalUrine Reflex to Culture                       Date: 08/22/2021Value: Not Indicated                     Status: FinalLactic Acid                                   Date: 08/22/2021Value: 1.2         Ref range: 0.5 - 2.2 mmol/L   Status: FinalHyaline Casts, UA                             Date: 08/22/2021Value: 5-10*       Ref range: 0 - 5 /LPF         Status: FinalRenal Epithelial, UA                          Date: 08/22/2021Value: 0-2*        Ref range: /HPF               Status: FinalBacteria, UA                                  Date: 08/22/2021Value: Negative    Ref range: Negative /HPF      Status: [address], UA                                       Date: 08/22/2021Value: 6-9*        Ref range: 0 - 5 /HPF         Status: FinalRBC, UA                                       Date: 08/22/2021Value: 3-5*        Ref range: 0 - 5 /HPF         Status: FinalEpithelial Cells, UA                          Date: 08/22/2021Value: 3-5         Ref range: 0 - 5 /HPF         Status: FinalSodium                                        Date: 08/23/2021Value: 139         Ref range: 135 - 144 mEq/L    Status: FinalPotassium reflex Magnesium                    Date: 08/23/2021Value: 3.6         Ref range: 3.4 - 4.9 mEq/L    Status: FinalChloride                                      Date: 08/23/2021Value: 103         Ref range: 95 - 107 mEq/L     Status: FinalCO2                                           Date: 08/23/2021Value: 23          Ref range: 20 - 31 mEq/L      Status: FinalAnion Gap                                     Date: 08/23/2021Value: 13          Ref range: 9 - 15 mEq/L       Status: FinalGlucose                                       Date: 08/23/2021Value: 92          Ref range: 70 - 99 mg/dL      Status: FinalBUN                                           Date: 08/23/2021Value: 37*         Ref range: 8 - 23 mg/dL       Status: FinalCREATININE                                    Date: 08/23/2021Value: 1.48*       Ref range: 0.50 - 0.90 mg/dL  Status: FinalGFR Non-                      Date: 08/23/2021Value: 35.4*       Ref range: >60                Status: Final              Comment: >60 mL/min/1.73m2 EGFR, calc. for ages 25 and older using theMDRD formula (not corrected for weight), is valid for stablerenal function. GFR                           Date: 08/23/2021Value: 42.8*       Ref range: >60                Status: Final              Comment: >60 mL/min/1.73m2 EGFR, calc. for ages 25 and older using theMDRD formula (not corrected for weight), is valid for stablerenal function. Calcium                                       Date: 08/23/2021Value: 9.4         Ref range: 8.5 - 9.9 mg/dL    Status: FinalTotal Protein                                 Date: 08/23/2021Value: 6.4         Ref range: 6.3 - 8.0 g/dL     Status: FinalAlbumin                                       Date: 08/23/2021Value: 4.0         Ref range: 3.5 - 4.6 g/dL     Status: FinalTotal Bilirubin                               Date: 08/23/2021Value: 0.4         Ref range: 0.2 - 0.7 mg/dL    Status: FinalAlkaline Phosphatase                          Date: 08/23/2021Value: 74          Ref range: 40 - 130 U/L Status: FinalALT                                           Date: 08/23/2021Value: 35*         Ref range: 0 - 33 U/L         Status: FinalAST                                           Date: 08/23/2021Value: 40*         Ref range: 0 - 35 U/L         Status: FinalGlobulin                                      Date: 08/23/2021Value: 2.4         Ref range: 2.3 - 3.5 g/dL     Status: [address] [facility]                                           Date: 08/23/2021Value: 8.0         Ref range: 4.8 - 10.8 K/uL    Status: FinalRBC                                           Date: 08/23/2021Value: 4.46        Ref range: 4.20 - 5.40 M/uL   Status: FinalHemoglobin                                    Date: 08/23/2021Value: 14.0        Ref range: 12.0 - 16.0 g/dL   Status: FinalHematocrit                                    Date: 08/23/2021Value: 41.9        Ref range: 37.0 - 47.0 %      Status: FinalMCV                                           Date: 08/23/2021Value: 94.0        Ref range: 82.0 - 100.0 fL    Status: [facility]                                           Date: 08/23/2021Value: 31.4*       Ref range: 27.0 - 31.3 pg     Status: [address]                                          Date: 08/23/2021Value: 33.5        Ref range: 33.0 - 37.0 %      Status: FinalRDW                                           Date: 08/23/2021Value: 12.8        Ref range: 11.5 - 14.5 %      Status: FinalPlatelets                                     Date: 08/23/2021Value: 186         Ref range: 130 - 400 K/uL     Status: FinalNeutrophils %                                 Date: 08/23/2021Value: 61.8        Ref range: %                  Status: FinalLymphocytes %                                 Date: 08/23/2021Value: 24.3        Ref range: %                  Status: FinalMonocytes %                                   Date: 08/23/2021Value: 12.7        Ref range: %                  Status: FinalEosinophils %                                 Date: 08/23/2021Value: 0.8 Ref range: %                  Status: FinalBasophils %                                   Date: 08/23/2021Value: 0.4         Ref range: %                  Status: FinalNeutrophils Absolute                          Date: 08/23/2021Value: 4.9         Ref range: 1.4 - 6.5 K/uL     Status: FinalLymphocytes Absolute                          Date: 08/23/2021Value: 1.9         Ref range: 1.0 - 4.8 K/uL     Status: FinalMonocytes Absolute                            Date: 08/23/2021Value: 1.0*        Ref range: 0.2 - 0.8 K/uL     Status: FinalEosinophils Absolute                          Date: 08/23/2021Value: 0.1         Ref range: 0.0 - 0.7 K/uL     Status: FinalBasophils Absolute                            Date: 08/23/2021Value: 0.0         Ref range: 0.0 - 0.2 K/uL     Status: FinalHep A IgM                                     Date: 08/23/2021Value: Non-reactive                     Status: FinalHep B Core Ab, IgM                            Date: 08/23/2021Value: Non-reactive                     Status: FinalHep B S Ag Interp                             Date: 08/23/2021Value: Non-reactive                     Status: FinalHep C Ab Interp                               Date: 08/23/2021Value: REACTIVE*     Status: FinalHepatitis Interpretation:                     Date: 08/23/2021Value: see below     Status: Final              Comment: The acute hepatitis panel indicates that the patient eitherhas acute hepatitis C infection, has been infected withhepatitis C in the past, or has a false positive anti-HCVresult. There is no evidence of acute hepatitis A or Binfection. Troponin                                      Date: 08/23/2021Value: <0.010      Ref range: 0.000 - 0.010 ng*  Status: Final              Comment: Methodology by Troponin T. Troponin                                      Date: 08/23/2021Value: <0.010      Ref range: 0.000 - 0.010 ng*  Status: Final              Comment: Methodology by Troponin T. Ventricular Rate Date: 08/23/2021Value: 72          Ref range: BPM                Status: FinalAtrial Rate                                   Date: 08/23/2021Value: 65          Ref range: BPM                Status: FinalP-R Interval                                  Date: 08/23/2021Value: 178         Ref range: ms                 Status: FinalQRS Duration                                  Date: 08/23/2021Value: 84          Ref range: ms                 Status: FinalQ-T Interval                                  Date: 08/23/2021Value: 436         Ref range: ms                 Status: FinalQTc Calculation (Bazett)                      Date: 08/23/2021Value: 453         Ref range: ms                 Status: FinalP Axis                                        Date: 08/23/2021Value: 74          Ref range: degrees            Status: FinalR Axis                                        Date: 08/23/2021Value: 76          Ref range: degrees            Status: FinalT Axis                                        Date: 08/23/2021Value: 68          Ref range: degrees            Status: FinalPOC Creatinine                                Date: 08/22/2021Value: 2.1*        Ref range: 0.6 - 1.2 mg/dL    Status: FinalGFR Non-                      Date: 08/22/2021Value: 24*         Ref range: >60                Status: Final              Comment: >60 mL/min/1.73m2 EGFR, calc. for ages 25 and older using theMDRD formula (not corrected for weight), is valid for stablerenal function. GFR                           Date: 08/22/2021Value: 29*         Ref range: >60                Status: Final              Comment: >60 mL/min/1.73m2 EGFR, calc. for ages 25 and older using theMDRD formula (not corrected for weight), is valid for stablerenal function. Sample Type                                   Date: 08/22/2021Value: TIARA           Status: FinalPerformed on                                  Date: 08/22/2021Value: SEE BELOW     Status: Final              Comment: Performed on POC------------    Radiology last 7 days:  CT ABDOMEN PELVIS WO CONTRAST Additional Contrast? NoneResult Date: 8/23/2021Common duct dilatation as discussed in post cholecystectomy patient. All CT scans at this facility use dose modulation, iterative reconstruction, and/or weight based dosing when appropriate to reduce radiation dose to as low as reasonably achievable. XR TIBIA FIBULA RIGHT (2 VIEWS)Result Date: 8/23/2021Mildly displaced right distal fibular diaphyseal fracture with mild impaction. CT Head WO ContrastResult Date: 8/23/2021Impression: (Negative CT of the brain). All CT scans at this facility use dose modulation, iterative reconstruction, and/or weight based dosing when appropriate to reduce radiation dose to as low as reasonably achievable. XR CHEST PORTABLEResult Date: 8/23/2021NO ACUTE CARDIOPULMONARY ABNORMALITY. NO CHANGE. US ABDOMEN LIMITED Specify organ? LIVERResult Date: 8/23/2021EXTRAHEPATIC DUCTAL DILATATION IN POSTCHOLECYSTECTOMY PATIENT. [unfilled]    Discharge Medications    Discharge Medication List as of 8/23/2021  3:22 PM    Discharge Medication List as of 8/23/2021  3:22 PM    Discharge Medication List as of 8/23/2021  3:22 PMCONTINUE these medications which have NOT CHANGEDmagnesium (MAGNESIUM-OXIDE) 250 MG TABS tabletTake 500 mg by mouth as neededHistorical Medibuprofen (ADVIL;MOTRIN) 200 MG CAPSTake 1-2 tablets by mouth as neededHistorical Medamphetamine-dextroamphetamine (ADDERALL XR) 20 MG extended release capsuleTake 20 mg by mouth daily as needed. Historical Medlisinopril-hydroCHLOROthiazide (PRINZIDE;ZESTORETIC) 10-12.5 MG per tabletTake 2 tablets by mouth dailyHistorical MedFLUoxetine (PROZAC) 20 MG capsuleTake 20 mg by mouth dailyHistorical Med    Discharge Medication List as of 8/23/2021  3:22 PMSTOP taking these medicationslisinopril (PRINIVIL) 10 MG tabletComments:Reason for Stopping:diclofenac (VOLTAREN) 75 MG EC tabletComments:Reason for Stopping:    Time Spent on Discharge:2E] minutes were spent in patient examination, evaluation, counseling as well as medication reconciliation, prescriptions for required medications, discharge plan, and follow up.     Electronically signed by Sushma Celeste DO on 8/23/21 at 10:16 PM EDT

## 2023-03-13 LAB — PANCREATIC ELASTASE, FECAL: >800 UG/G

## 2024-09-24 ENCOUNTER — APPOINTMENT (OUTPATIENT)
Dept: CT IMAGING | Age: 69
End: 2024-09-24
Payer: MEDICARE

## 2024-09-24 ENCOUNTER — APPOINTMENT (OUTPATIENT)
Dept: GENERAL RADIOLOGY | Age: 69
End: 2024-09-24
Payer: MEDICARE

## 2024-09-24 ENCOUNTER — APPOINTMENT (OUTPATIENT)
Age: 69
End: 2024-09-24
Attending: INTERNAL MEDICINE
Payer: MEDICARE

## 2024-09-24 ENCOUNTER — APPOINTMENT (OUTPATIENT)
Dept: MRI IMAGING | Age: 69
End: 2024-09-24
Attending: INTERNAL MEDICINE
Payer: MEDICARE

## 2024-09-24 ENCOUNTER — HOSPITAL ENCOUNTER (INPATIENT)
Age: 69
LOS: 1 days | Discharge: HOME | End: 2024-09-25
Attending: INTERNAL MEDICINE | Admitting: INTERNAL MEDICINE
Payer: MEDICARE

## 2024-09-24 ENCOUNTER — APPOINTMENT (OUTPATIENT)
Dept: ULTRASOUND IMAGING | Age: 69
End: 2024-09-24
Payer: MEDICARE

## 2024-09-24 DIAGNOSIS — I63.10 CEREBROVASCULAR ACCIDENT (CVA) DUE TO EMBOLISM OF PRECEREBRAL ARTERY (HCC): ICD-10-CM

## 2024-09-24 DIAGNOSIS — I63.9 CEREBROVASCULAR ACCIDENT (CVA), UNSPECIFIED MECHANISM (HCC): Primary | ICD-10-CM

## 2024-09-24 DIAGNOSIS — R53.1 ACUTE RIGHT-SIDED WEAKNESS: ICD-10-CM

## 2024-09-24 DIAGNOSIS — I63.9 ACUTE CEREBROVASCULAR ACCIDENT (CVA) (HCC): ICD-10-CM

## 2024-09-24 LAB
ALBUMIN SERPL-MCNC: 4 G/DL (ref 3.5–4.6)
ALP SERPL-CCNC: 75 U/L (ref 40–130)
ALT SERPL-CCNC: 16 U/L (ref 0–33)
ANION GAP SERPL CALCULATED.3IONS-SCNC: 13 MEQ/L (ref 9–15)
APTT PPP: 27.7 SEC (ref 24.4–36.8)
AST SERPL-CCNC: 20 U/L (ref 0–35)
BACTERIA URNS QL MICRO: NEGATIVE /HPF
BASOPHILS # BLD: 0 K/UL (ref 0–0.2)
BASOPHILS NFR BLD: 0.4 %
BILIRUB SERPL-MCNC: <0.2 MG/DL (ref 0.2–0.7)
BILIRUB UR QL STRIP: NEGATIVE
BUN SERPL-MCNC: 23 MG/DL (ref 8–23)
CALCIUM SERPL-MCNC: 9.5 MG/DL (ref 8.5–9.9)
CHLORIDE SERPL-SCNC: 103 MEQ/L (ref 95–107)
CLARITY UR: CLEAR
CO2 SERPL-SCNC: 25 MEQ/L (ref 20–31)
COLOR UR: YELLOW
CREAT SERPL-MCNC: 1.11 MG/DL (ref 0.5–0.9)
ECHO AR MAX VEL PISA: 3.8 M/S
ECHO AV AREA PEAK VELOCITY: 2.1 CM2
ECHO AV AREA PEAK VELOCITY: 2.1 CM2
ECHO AV AREA VTI: 1.5 CM2
ECHO AV AREA/BSA VTI: 0.8 CM2/M2
ECHO AV CUSP MM: 1.5 CM
ECHO AV MEAN GRADIENT: 6 MMHG
ECHO AV MEAN VELOCITY: 1.1 M/S
ECHO AV PEAK GRADIENT: 13 MMHG
ECHO AV PEAK GRADIENT: 16 MMHG
ECHO AV PEAK VELOCITY: 2 M/S
ECHO AV PEAK VELOCITY: 2 M/S
ECHO AV REGURGITANT PHT: 597.4 MILLISECOND
ECHO AV VTI: 57 CM
ECHO BSA: 1.98 M2
ECHO EST RA PRESSURE: 3 MMHG
ECHO LA DIAMETER INDEX: 1.79 CM/M2
ECHO LA DIAMETER: 3.5 CM
ECHO LA VOL A-L A4C: 50 ML (ref 22–52)
ECHO LA VOL MOD A4C: 48 ML (ref 22–52)
ECHO LA VOLUME INDEX A-L A4C: 26 ML/M2 (ref 16–34)
ECHO LA VOLUME INDEX MOD A4C: 25 ML/M2 (ref 16–34)
ECHO LV E' LATERAL VELOCITY: 8.8 CM/S
ECHO LV E' SEPTAL VELOCITY: 10 CM/S
ECHO LV EDV A2C: 67 ML
ECHO LV EDV A4C: 78 ML
ECHO LV EDV BP: 73 ML (ref 56–104)
ECHO LV EDV INDEX A4C: 40 ML/M2
ECHO LV EDV INDEX BP: 37 ML/M2
ECHO LV EDV NDEX A2C: 34 ML/M2
ECHO LV EJECTION FRACTION A2C: 64 %
ECHO LV EJECTION FRACTION A4C: 67 %
ECHO LV EJECTION FRACTION BIPLANE: 65 % (ref 55–100)
ECHO LV ESV A2C: 24 ML
ECHO LV ESV A4C: 26 ML
ECHO LV ESV BP: 26 ML (ref 19–49)
ECHO LV ESV INDEX A2C: 12 ML/M2
ECHO LV ESV INDEX A4C: 13 ML/M2
ECHO LV ESV INDEX BP: 13 ML/M2
ECHO LV FRACTIONAL SHORTENING: 34 % (ref 28–44)
ECHO LV INTERNAL DIMENSION DIASTOLE INDEX: 2.1 CM/M2
ECHO LV INTERNAL DIMENSION DIASTOLIC: 4.1 CM (ref 3.9–5.3)
ECHO LV INTERNAL DIMENSION SYSTOLIC INDEX: 1.38 CM/M2
ECHO LV INTERNAL DIMENSION SYSTOLIC: 2.7 CM
ECHO LV IVSD: 1.1 CM (ref 0.6–0.9)
ECHO LV IVSS: 1.1 CM
ECHO LV MASS 2D: 123 G (ref 67–162)
ECHO LV MASS INDEX 2D: 63.1 G/M2 (ref 43–95)
ECHO LV POSTERIOR WALL DIASTOLIC: 0.8 CM (ref 0.6–0.9)
ECHO LV POSTERIOR WALL SYSTOLIC: 1.6 CM
ECHO LV RELATIVE WALL THICKNESS RATIO: 0.39
ECHO LVOT AREA: 3.1 CM2
ECHO LVOT AV VTI INDEX: 0.47
ECHO LVOT DIAM: 2 CM
ECHO LVOT MEAN GRADIENT: 3 MMHG
ECHO LVOT PEAK GRADIENT: 7 MMHG
ECHO LVOT PEAK VELOCITY: 1.3 M/S
ECHO LVOT STROKE VOLUME INDEX: 43.2 ML/M2
ECHO LVOT SV: 84.2 ML
ECHO LVOT VTI: 26.8 CM
ECHO MV A VELOCITY: 0.76 M/S
ECHO MV AREA VTI: 2.8 CM2
ECHO MV E DECELERATION TIME (DT): 204 MS
ECHO MV E VELOCITY: 0.71 M/S
ECHO MV E/A RATIO: 0.93
ECHO MV E/E' LATERAL: 8.07
ECHO MV E/E' RATIO (AVERAGED): 7.58
ECHO MV E/E' SEPTAL: 7.1
ECHO MV LVOT VTI INDEX: 1.12
ECHO MV MAX VELOCITY: 0.8 M/S
ECHO MV MEAN GRADIENT: 1 MMHG
ECHO MV MEAN VELOCITY: 0.5 M/S
ECHO MV PEAK GRADIENT: 3 MMHG
ECHO MV VTI: 30 CM
ECHO PV MAX VELOCITY: 1.1 M/S
ECHO PV PEAK GRADIENT: 4 MMHG
ECHO RIGHT VENTRICULAR SYSTOLIC PRESSURE (RVSP): 36 MMHG
ECHO RV INTERNAL DIMENSION: 3.1 CM
ECHO RV TAPSE: 1.9 CM (ref 1.7–?)
ECHO TV REGURGITANT MAX VELOCITY: 2.89 M/S
ECHO TV REGURGITANT PEAK GRADIENT: 34 MMHG
EKG ATRIAL RATE: 67 BPM
EKG P AXIS: 53 DEGREES
EKG P-R INTERVAL: 176 MS
EKG Q-T INTERVAL: 418 MS
EKG QRS DURATION: 82 MS
EKG QTC CALCULATION (BAZETT): 441 MS
EKG R AXIS: 43 DEGREES
EKG T AXIS: 51 DEGREES
EKG VENTRICULAR RATE: 67 BPM
EOSINOPHIL # BLD: 0.1 K/UL (ref 0–0.7)
EOSINOPHIL NFR BLD: 1.9 %
EPI CELLS #/AREA URNS AUTO: ABNORMAL /HPF (ref 0–5)
ERYTHROCYTE [DISTWIDTH] IN BLOOD BY AUTOMATED COUNT: 13.1 % (ref 11.5–14.5)
GLOBULIN SER CALC-MCNC: 2.7 G/DL (ref 2.3–3.5)
GLUCOSE SERPL-MCNC: 113 MG/DL (ref 70–99)
GLUCOSE UR STRIP-MCNC: NEGATIVE MG/DL
HCT VFR BLD AUTO: 39.9 % (ref 37–47)
HGB BLD-MCNC: 13.1 G/DL (ref 12–16)
HGB UR QL STRIP: NEGATIVE
HYALINE CASTS #/AREA URNS AUTO: ABNORMAL /HPF (ref 0–5)
INR PPP: 1
KETONES UR STRIP-MCNC: ABNORMAL MG/DL
LEUKOCYTE ESTERASE UR QL STRIP: ABNORMAL
LYMPHOCYTES # BLD: 1.7 K/UL (ref 1–4.8)
LYMPHOCYTES NFR BLD: 24.9 %
MCH RBC QN AUTO: 31 PG (ref 27–31.3)
MCHC RBC AUTO-ENTMCNC: 32.8 % (ref 33–37)
MCV RBC AUTO: 94.3 FL (ref 79.4–94.8)
MONOCYTES # BLD: 0.5 K/UL (ref 0.2–0.8)
MONOCYTES NFR BLD: 7.4 %
NEUTROPHILS # BLD: 4.5 K/UL (ref 1.4–6.5)
NEUTS SEG NFR BLD: 65 %
NITRITE UR QL STRIP: NEGATIVE
PERFORMED ON: NORMAL
PH UR STRIP: 5.5 [PH] (ref 5–9)
PLATELET # BLD AUTO: 189 K/UL (ref 130–400)
POC CREATININE: 1 MG/DL (ref 0.6–1.2)
POC SAMPLE TYPE: NORMAL
POTASSIUM SERPL-SCNC: 3.6 MEQ/L (ref 3.4–4.9)
PROT SERPL-MCNC: 6.7 G/DL (ref 6.3–8)
PROT UR STRIP-MCNC: NEGATIVE MG/DL
PROTHROMBIN TIME: 13 SEC (ref 12.3–14.9)
RBC # BLD AUTO: 4.23 M/UL (ref 4.2–5.4)
RBC #/AREA URNS AUTO: ABNORMAL /HPF (ref 0–5)
SODIUM SERPL-SCNC: 141 MEQ/L (ref 135–144)
SP GR UR STRIP: 1.02 (ref 1–1.03)
TROPONIN, HIGH SENSITIVITY: <6 NG/L (ref 0–19)
TROPONIN, HIGH SENSITIVITY: <6 NG/L (ref 0–19)
TSH REFLEX: 3.01 UIU/ML (ref 0.44–3.86)
URINE REFLEX TO CULTURE: YES
UROBILINOGEN UR STRIP-ACNC: 0.2 E.U./DL
WBC # BLD AUTO: 6.9 K/UL (ref 4.8–10.8)
WBC #/AREA URNS AUTO: ABNORMAL /HPF (ref 0–5)

## 2024-09-24 PROCEDURE — 70496 CT ANGIOGRAPHY HEAD: CPT

## 2024-09-24 PROCEDURE — 81001 URINALYSIS AUTO W/SCOPE: CPT

## 2024-09-24 PROCEDURE — 93005 ELECTROCARDIOGRAM TRACING: CPT | Performed by: PHYSICIAN ASSISTANT

## 2024-09-24 PROCEDURE — 6370000000 HC RX 637 (ALT 250 FOR IP): Performed by: INTERNAL MEDICINE

## 2024-09-24 PROCEDURE — 84484 ASSAY OF TROPONIN QUANT: CPT

## 2024-09-24 PROCEDURE — 70450 CT HEAD/BRAIN W/O DYE: CPT

## 2024-09-24 PROCEDURE — 70498 CT ANGIOGRAPHY NECK: CPT

## 2024-09-24 PROCEDURE — 80053 COMPREHEN METABOLIC PANEL: CPT

## 2024-09-24 PROCEDURE — 6370000000 HC RX 637 (ALT 250 FOR IP): Performed by: PHYSICIAN ASSISTANT

## 2024-09-24 PROCEDURE — 36415 COLL VENOUS BLD VENIPUNCTURE: CPT

## 2024-09-24 PROCEDURE — 99285 EMERGENCY DEPT VISIT HI MDM: CPT

## 2024-09-24 PROCEDURE — 92523 SPEECH SOUND LANG COMPREHEN: CPT

## 2024-09-24 PROCEDURE — 2580000003 HC RX 258: Performed by: INTERNAL MEDICINE

## 2024-09-24 PROCEDURE — 87086 URINE CULTURE/COLONY COUNT: CPT

## 2024-09-24 PROCEDURE — 93880 EXTRACRANIAL BILAT STUDY: CPT

## 2024-09-24 PROCEDURE — 71045 X-RAY EXAM CHEST 1 VIEW: CPT

## 2024-09-24 PROCEDURE — 82746 ASSAY OF FOLIC ACID SERUM: CPT

## 2024-09-24 PROCEDURE — 92610 EVALUATE SWALLOWING FUNCTION: CPT

## 2024-09-24 PROCEDURE — 93306 TTE W/DOPPLER COMPLETE: CPT | Performed by: INTERNAL MEDICINE

## 2024-09-24 PROCEDURE — 85610 PROTHROMBIN TIME: CPT

## 2024-09-24 PROCEDURE — 6360000002 HC RX W HCPCS: Performed by: INTERNAL MEDICINE

## 2024-09-24 PROCEDURE — 84443 ASSAY THYROID STIM HORMONE: CPT

## 2024-09-24 PROCEDURE — 82607 VITAMIN B-12: CPT

## 2024-09-24 PROCEDURE — 6360000004 HC RX CONTRAST MEDICATION: Performed by: PHYSICIAN ASSISTANT

## 2024-09-24 PROCEDURE — 93010 ELECTROCARDIOGRAM REPORT: CPT | Performed by: INTERNAL MEDICINE

## 2024-09-24 PROCEDURE — 85025 COMPLETE CBC W/AUTO DIFF WBC: CPT

## 2024-09-24 PROCEDURE — 1210000000 HC MED SURG R&B

## 2024-09-24 PROCEDURE — 93306 TTE W/DOPPLER COMPLETE: CPT

## 2024-09-24 PROCEDURE — 85730 THROMBOPLASTIN TIME PARTIAL: CPT

## 2024-09-24 PROCEDURE — 70551 MRI BRAIN STEM W/O DYE: CPT

## 2024-09-24 RX ORDER — ATORVASTATIN CALCIUM 80 MG/1
80 TABLET, FILM COATED ORAL NIGHTLY
Status: DISCONTINUED | OUTPATIENT
Start: 2024-09-24 | End: 2024-09-25 | Stop reason: HOSPADM

## 2024-09-24 RX ORDER — NALTREXONE HYDROCHLORIDE 50 MG/1
50 TABLET, FILM COATED ORAL
Status: DISCONTINUED | OUTPATIENT
Start: 2024-09-25 | End: 2024-09-25 | Stop reason: HOSPADM

## 2024-09-24 RX ORDER — IOPAMIDOL 755 MG/ML
75 INJECTION, SOLUTION INTRAVASCULAR
Status: COMPLETED | OUTPATIENT
Start: 2024-09-24 | End: 2024-09-24

## 2024-09-24 RX ORDER — SODIUM CHLORIDE 9 MG/ML
INJECTION, SOLUTION INTRAVENOUS PRN
Status: DISCONTINUED | OUTPATIENT
Start: 2024-09-24 | End: 2024-09-25 | Stop reason: HOSPADM

## 2024-09-24 RX ORDER — ASPIRIN 300 MG/1
300 SUPPOSITORY RECTAL DAILY
Status: DISCONTINUED | OUTPATIENT
Start: 2024-09-25 | End: 2024-09-25 | Stop reason: HOSPADM

## 2024-09-24 RX ORDER — ONDANSETRON 4 MG/1
4 TABLET, ORALLY DISINTEGRATING ORAL EVERY 8 HOURS PRN
Status: DISCONTINUED | OUTPATIENT
Start: 2024-09-24 | End: 2024-09-25 | Stop reason: HOSPADM

## 2024-09-24 RX ORDER — SODIUM CHLORIDE 0.9 % (FLUSH) 0.9 %
5-40 SYRINGE (ML) INJECTION EVERY 12 HOURS SCHEDULED
Status: DISCONTINUED | OUTPATIENT
Start: 2024-09-24 | End: 2024-09-25 | Stop reason: HOSPADM

## 2024-09-24 RX ORDER — POLYETHYLENE GLYCOL 3350 17 G/17G
17 POWDER, FOR SOLUTION ORAL DAILY PRN
Status: DISCONTINUED | OUTPATIENT
Start: 2024-09-24 | End: 2024-09-25 | Stop reason: HOSPADM

## 2024-09-24 RX ORDER — ENOXAPARIN SODIUM 100 MG/ML
40 INJECTION SUBCUTANEOUS DAILY
Status: DISCONTINUED | OUTPATIENT
Start: 2024-09-24 | End: 2024-09-25 | Stop reason: HOSPADM

## 2024-09-24 RX ORDER — LABETALOL HYDROCHLORIDE 5 MG/ML
10 INJECTION, SOLUTION INTRAVENOUS EVERY 10 MIN PRN
Status: DISCONTINUED | OUTPATIENT
Start: 2024-09-24 | End: 2024-09-25 | Stop reason: HOSPADM

## 2024-09-24 RX ORDER — ASPIRIN 325 MG
325 TABLET ORAL ONCE
Status: COMPLETED | OUTPATIENT
Start: 2024-09-24 | End: 2024-09-24

## 2024-09-24 RX ORDER — ASPIRIN 81 MG/1
81 TABLET, CHEWABLE ORAL DAILY
Status: DISCONTINUED | OUTPATIENT
Start: 2024-09-25 | End: 2024-09-25 | Stop reason: HOSPADM

## 2024-09-24 RX ORDER — NALTREXONE HYDROCHLORIDE 50 MG/1
50 TABLET, FILM COATED ORAL DAILY
COMMUNITY

## 2024-09-24 RX ORDER — ONDANSETRON 2 MG/ML
4 INJECTION INTRAMUSCULAR; INTRAVENOUS EVERY 6 HOURS PRN
Status: DISCONTINUED | OUTPATIENT
Start: 2024-09-24 | End: 2024-09-25 | Stop reason: HOSPADM

## 2024-09-24 RX ORDER — SODIUM CHLORIDE 0.9 % (FLUSH) 0.9 %
5-40 SYRINGE (ML) INJECTION PRN
Status: DISCONTINUED | OUTPATIENT
Start: 2024-09-24 | End: 2024-09-25 | Stop reason: HOSPADM

## 2024-09-24 RX ORDER — LANOLIN ALCOHOL/MO/W.PET/CERES
100 CREAM (GRAM) TOPICAL DAILY
Status: DISCONTINUED | OUTPATIENT
Start: 2024-09-24 | End: 2024-09-25 | Stop reason: HOSPADM

## 2024-09-24 RX ADMIN — IOPAMIDOL 75 ML: 755 INJECTION, SOLUTION INTRAVENOUS at 08:29

## 2024-09-24 RX ADMIN — Medication 100 MG: at 17:38

## 2024-09-24 RX ADMIN — Medication 10 ML: at 21:09

## 2024-09-24 RX ADMIN — ASPIRIN 325 MG: 325 TABLET ORAL at 09:30

## 2024-09-24 RX ADMIN — ATORVASTATIN CALCIUM 80 MG: 80 TABLET, FILM COATED ORAL at 21:08

## 2024-09-24 RX ADMIN — ENOXAPARIN SODIUM 40 MG: 100 INJECTION SUBCUTANEOUS at 17:38

## 2024-09-24 RX ADMIN — CEFTRIAXONE SODIUM 1000 MG: 1 INJECTION, POWDER, FOR SOLUTION INTRAMUSCULAR; INTRAVENOUS at 17:39

## 2024-09-24 ASSESSMENT — ENCOUNTER SYMPTOMS
COLOR CHANGE: 0
RHINORRHEA: 0
ABDOMINAL DISTENTION: 0
ABDOMINAL PAIN: 0
SHORTNESS OF BREATH: 0
EYE DISCHARGE: 0
CONSTIPATION: 0
SORE THROAT: 0

## 2024-09-24 ASSESSMENT — PAIN - FUNCTIONAL ASSESSMENT: PAIN_FUNCTIONAL_ASSESSMENT: NONE - DENIES PAIN

## 2024-09-24 ASSESSMENT — LIFESTYLE VARIABLES
HOW MANY STANDARD DRINKS CONTAINING ALCOHOL DO YOU HAVE ON A TYPICAL DAY: PATIENT DOES NOT DRINK
HOW OFTEN DO YOU HAVE A DRINK CONTAINING ALCOHOL: NEVER

## 2024-09-24 NOTE — PROGRESS NOTES
[facility]   Facility/Department: [address] TELE  Speech Language Pathology  Clinical Bedside Swallow Evaluation    NAME:Sharon Celis  : 1955 (68 y.o.)   [x]   confirmed    MRN: [id number]  ROOM: [place] [address]  ADMISSION DATE: 2024  PATIENT DIAGNOSIS(ES): Acute right-sided weakness [R53.1]  Cerebrovascular accident (CVA) due to embolism of precerebral artery (HCC) [I63.10]  Acute cerebrovascular accident (CVA) (HCC) [I63.9]  Cerebrovascular accident (CVA), unspecified mechanism (HCC) [I63.9]  Chief Complaint   Patient presents with    Cerebrovascular Accident     States she slept in yesterday and her right side felt weird and she brushed it off today when she woke up she said her right arm will not do what she wants to do and her gait is off. Pt also has right sided facial droop.      Patient Active Problem List    Diagnosis Date Noted    Acute cerebrovascular accident (CVA) (HCC) 2024    Syncope and collapse 2021    Right fibular fracture 2021    Elevated liver enzymes 2021    Abdominal pain 2021    NATHANIEL (acute kidney injury) (HCC) 2021    Patient overweight 2012    Fatigue 2012    Hep C w/o coma, chronic (HCC) 2012    Hepatitis C 2011    Essential hypertension, benign 2011    Esophageal reflux 2011     Past Medical History:   Diagnosis Date    GERD (gastroesophageal reflux disease)     Hypertension     Other specified episodic mood disorder     Unspecified viral hepatitis C without hepatic coma      Past Surgical History:   Procedure Laterality Date    CHOLECYSTECTOMY      SKIN GRAFT      left arm/elbow     No Known Allergies    DATE ONSET: 2024    Date of Evaluation: 2024   Evaluating Therapist: Dariana Mitchell, SLP    Dysphagia Diagnosis  Dysphagia Diagnosis: Swallow function appears WFL  Dysphagia Impression : Patient presented with oral phase of swallow and suspected pharyngeal phase of swallow that is

## 2024-09-24 NOTE — PROGRESS NOTES
[facility]  Occupational Therapy        NAME:  Sharon Celis  ROOM: W267/W267-01  :  1955  DATE: 2024    Attempted to see Sharon Celis at 1515 on this date for:   [x]  Initial Evaluation   []  Treatment       Patient was unable to be seen due to:   [] Off unit for testing/procedure    [] Patient refused, stating \"    [] Therapy on hold due to     [] Nursing deferred due to    [x] Other:  Pt off unit for MRI    Electronically signed by JACOB Trammell on 24 at 3:13 PM EDT

## 2024-09-24 NOTE — ED NOTES
Urine collected and sent at this time   All labs collected and sent at this time  Family at the bedside, call light with in reach

## 2024-09-24 NOTE — CARE COORDINATION
09/24/24    From:HOME SON & HIS PARTNER. BASELINE A&O X 4, IND W ADL'S & AMB DRIVES.      Admit:CVA     PMH:HTN, GERD, HEP C    Anticipated Discharge Disposition:ACUTE REHAB VS [facility]    Patient Mobility or PT/OT ordered:YES    Consults: NEURO, DIETICIAN, SLP    Clinical:  SR--RA HAS RUE DEFICIT  CTB & CEREBRAL CAROTID NEG--23/ 1.11--    Barriers to Discharge:  NEURO, SLP, PT/OT NEED TO SEE  MRI BRAIN, ECHO, CAROTID NEEDS DONE   NO RECORD OF PCP AS SHE STATES  URINE CULTURE    Assessments: CMI/ ACP DECISION MAKER DONE

## 2024-09-24 NOTE — ACP (ADVANCE CARE PLANNING)
Advance Care Planning   Healthcare Decision Maker:    Primary Decision Maker: HOLLY RACHEL - Child - [phone]    Secondary Decision Maker: JS CAPPS - Son-in-Law - [phone]    Click here to complete Healthcare Decision Makers including selection of the Healthcare Decision Maker Relationship (ie \"Primary\").  Today we documented Decision Maker(s) consistent with Legal Next of Kin hierarchy.       Electronically signed by Lisa Hurtado, RN, BSN on 9/24/2024 at 11:34 AM

## 2024-09-24 NOTE — FLOWSHEET NOTE
Pt to unit from ER, VSS, assesmnt as per flowsheet. Pt passed bedside swallow and diet ordered, MRI form filled out w/pt, cont to monitor Electronically signed by Bhupinder Beaver RN on 9/24/2024 at 12:14 PM

## 2024-09-24 NOTE — PROGRESS NOTES
[facility]   Facility/Department: [address] TELE  Speech Language Pathology  Initial Speech/Language/Cognitive Assessment    NAME:Sharon Celis  : 1955 (68 y.o.)   [x]   confirmed    MRN: [id number]  ROOM: [place] [id number]  ADMISSION DATE: 2024  PATIENT DIAGNOSIS(ES): Acute right-sided weakness [R53.1]  Cerebrovascular accident (CVA) due to embolism of precerebral artery (HCC) [I63.10]  Acute cerebrovascular accident (CVA) (HCC) [I63.9]  Cerebrovascular accident (CVA), unspecified mechanism (HCC) [I63.9]  Chief Complaint   Patient presents with    Cerebrovascular Accident     States she slept in yesterday and her right side felt weird and she brushed it off today when she woke up she said her right arm will not do what she wants to do and her gait is off. Pt also has right sided facial droop.      Patient Active Problem List    Diagnosis Date Noted    Acute cerebrovascular accident (CVA) (HCC) 2024    Syncope and collapse 2021    Right fibular fracture 2021    Elevated liver enzymes 2021    Abdominal pain 2021    NATHANIEL (acute kidney injury) (HCC) 2021    Patient overweight 2012    Fatigue 2012    Hep C w/o coma, chronic (HCC) 2012    Hepatitis C 2011    Essential hypertension, benign 2011    Esophageal reflux 2011     Past Medical History:   Diagnosis Date    GERD (gastroesophageal reflux disease)     Hypertension     Other specified episodic mood disorder     Unspecified viral hepatitis C without hepatic coma      Past Surgical History:   Procedure Laterality Date    CHOLECYSTECTOMY      SKIN GRAFT      left arm/elbow       Date of Onset: 2024    Date of Evaluation: 2024   Evaluating Therapist: Dariana Mitchell SLP        Diagnosis: Patient presents with cognitive-lingusitic skills that are within normal limits. Patient demonstrates no difficulty with auditory comprehension, verbal expression, motoe speech,  orientation, short term memory, and problem solving skills that are within normal limits.    Requires SLP Intervention: No          General  Subjective  Subjective: Pt is a 68 year ld who presented to ED with right side numbness and right facial droop. No prior history of dysphagia per chart review. Pt referred for speech and langauge evauation due to CVA symptoms.  Behavior/Cognition  Behavior/Cognition: Alert;Pleasant mood   Respiratory Status: Room air  O2 Device: None (Room air)  Previous level of function and limitations:        Vision and Hearing  Vision  Vision: Within Functional Limits  Hearing  Hearing: Within functional limits     Oral/Motor  Oral Motor   Labial: No impairment  Dentition: Natural  Oral Hygiene: Clean  Lingual: No impairment  Velum: No Impairment    Motor Speech  Motor Speech  Apraxic Characteristics: None  Dysarthric Characteristics: None  Intelligibility: No impairment  Intonation: no impairment  Rate: no impairment  Prosody: no impairment  Overall Impairment Severity: None    Comprehension  Auditory Comprehension  Comprehension: Within Functional Limits    Expression  Expression  Primary Mode of Expression: Verbal  Verbal Expression  Verbal Expression: Within functional limits  Written Expression  Written Expression: Unable to assess    Cognition  Orientation  Overall Orientation Status: Within Normal Limits  Attention  Attention: Within Functional Limits  Memory  Memory: Within Functional Limits  Problem Solving  Problem Solving: Within Functional Limits  Abstract Reasoning  Abstract Reasoning: Within Functional Limits  Safety/Judgment  Safety/Judgment: Within Functional Limits    Additional Assessments       Recommendations  Requires SLP Intervention: No  Duration of Treatment: no f/u  Frequency of Treatment: no f/u    Prognosis  Speech Therapy Prognosis  Prognosis: Excellent  Prognosis Considerations: Age    Education  Patient Education: Pt educated on results of clinical swallow

## 2024-09-24 NOTE — ED NOTES
ASA given as ordered at this time  Patient sitting up in the bed, lights on, call light with in reach, family at the bedside, respirations even and unlabored.

## 2024-09-24 NOTE — H&P
of intravenous contrast. Multiplanar reformatted images are provided for review.  MIP images are provided for review. Automated exposure control, iterative reconstruction, and/or weight based adjustment of the mA/kV was utilized to reduce the radiation dose to as low as reasonably achievable. COMPARISON: None HISTORY: ORDERING SYSTEM PROVIDED HISTORY: right side weakness TECHNOLOGIST PROVIDED HISTORY: Reason for exam:->right side weakness Additional Contrast?->None What reading provider will be dictating this exam?->CRC Initial evaluation. FINDINGS: ANTERIOR CIRCULATION: Minimal atherosclerosis seen of the internal carotid arteries bilaterally.  No significant stenosis is seen of the internal carotid arteries.  No significant stenosis of the anterior cerebral or middle cerebral arteries.  No aneurysm identified. POSTERIOR CIRCULATION: No significant stenosis of the vertebral, basilar, or posterior cerebral arteries. No aneurysm. OTHER: No dural venous sinus thrombosis on this non-dedicated study.     No significant stenosis or large vessel occlusion of the [id number].     CT Head W/O Contrast    Result Date: 9/24/2024  EXAMINATION: CT OF THE HEAD WITHOUT CONTRAST  9/24/2024 8:28 am TECHNIQUE: CT of the head was performed without the administration of intravenous contrast. Automated exposure control, iterative reconstruction, and/or weight based adjustment of the mA/kV was utilized to reduce the radiation dose to as low as reasonably achievable. 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

## 2024-09-24 NOTE — CARE COORDINATION
Case Management Assessment  Initial Evaluation    Date/Time of Evaluation: 9/24/2024 11:34 AM  Assessment Completed by: Lisa Hurtado, RN, BSN    If patient is discharged prior to next notation, then this note serves as note for discharge by case management.    Patient Name: Sharon Celis                   YOB: 1955  Diagnosis: Acute cerebrovascular accident (CVA) (HCC) [I63.9]                   Date / Time: 9/24/2024  7:27 AM    Patient Admission Status: Inpatient   Readmission Risk (Low < 19, Mod (19-27), High > 27): No data recorded  Current PCP: Lit Fraga, DO  PCP verified by CM? Yes    Chart Reviewed: Yes      History Provided by: Patient  Patient Orientation: Alert and Oriented, Person, Place, Situation, Self    Patient Cognition: Alert    Hospitalization in the last 30 days (Readmission):  No    If yes, Readmission Assessment in  Navigator will be completed.    Advance Directives:      Code Status: Prior   Patient's Primary Decision Maker is: Legal Next of Kin    Primary Decision Maker: HOLLY RACHEL - Child - [phone]    Secondary Decision Maker: JS CAPPS - Son-in-Law - [phone]    Discharge Planning:    Patient lives with: Family Members, Children Type of Home: Acute Rehab, Other (Comment) (VS [facility])  Primary Care Giver: Self  Patient Support Systems include: Children   Current Financial resources: Other (Comment) (COMMERCIAL PLAN)  Current community resources: None  Current services prior to admission: None            Current DME:  [facility]            Type of Home Care services:  OT, PT, Nursing Services, Skilled Therapy    ADLS  Prior functional level: Independent in ADLs/IADLs  Current functional level: Independent in ADLs/IADLs, Other (see comment) (R SIDED DEFICIT GAIT AND RUE WEAKER)    Family can provide assistance at DC: Yes  Would you like Case Management to discuss the discharge plan with any other family members/significant others, and if so, who?

## 2024-09-24 NOTE — ED PROVIDER NOTES
[facility] ED  EMERGENCY DEPARTMENT ENCOUNTER      Pt Name: Sharon Celis  MRN: [id number]  Birthdate 1955  Date of evaluation: 9/24/2024  Provider: Faustino Milligan PA-C  7:41 AM EDT    CHIEF COMPLAINT       Chief Complaint   Patient presents with    Cerebrovascular Accident     States she slept in yesterday and her right side felt weird and she brushed it off today when she woke up she said her right arm will not do what she wants to do and her gait is off. Pt also has right sided facial droop.          HISTORY OF PRESENT ILLNESS   (Location/Symptom, Timing/Onset, Context/Setting, Quality, Duration, Modifying Factors, Severity)  Note limiting factors.   Sharon Celis is a 68 y.o. female who presents to the emergency department with complaint of right-sided weakness, which patient states was noted at 11 AM yesterday when she woke up, patient states when she went to bed thinking before she was not having any symptoms, when she woke up at 11 AM she was a weakness to the right side of her body, patient states she had a hard time using her right upper extremity, she could not get it to go where she wanted to, she had difficulty with ambulation, and felt off balance, she felt weakness to her right lower extremity.  Patient states yesterday when she tried to write, she was unable to write using her right hand which is her dominant side.  She denies any recent illness or injury, no recent falls.  She is not on any anticoagulation.  She states this morning when she got up her symptoms were still present, so she decided to come to the ED for evaluation.  Last known well time undetermined, as patient woke up with her symptoms at 11 AM yesterday.  Past medical history significant for gastric reflux, hypertension.    HPI    Nursing Notes were reviewed.    REVIEW OF SYSTEMS    (2-9 systems for level 4, 10 or more for level 5)     Review of Systems   Constitutional:  Negative for activity change and

## 2024-09-25 VITALS
BODY MASS INDEX: 27.28 KG/M2 | HEIGHT: 68 IN | HEART RATE: 65 BPM | SYSTOLIC BLOOD PRESSURE: 115 MMHG | RESPIRATION RATE: 16 BRPM | DIASTOLIC BLOOD PRESSURE: 65 MMHG | TEMPERATURE: 97.9 F | WEIGHT: 180 LBS | OXYGEN SATURATION: 98 %

## 2024-09-25 LAB
ANION GAP SERPL CALCULATED.3IONS-SCNC: 10 MEQ/L (ref 9–15)
BACTERIA UR CULT: NORMAL
BUN SERPL-MCNC: 20 MG/DL (ref 8–23)
CALCIUM SERPL-MCNC: 9.2 MG/DL (ref 8.5–9.9)
CHLORIDE SERPL-SCNC: 105 MEQ/L (ref 95–107)
CHOLEST SERPL-MCNC: 200 MG/DL (ref 0–199)
CO2 SERPL-SCNC: 27 MEQ/L (ref 20–31)
CREAT SERPL-MCNC: 0.88 MG/DL (ref 0.5–0.9)
ERYTHROCYTE [DISTWIDTH] IN BLOOD BY AUTOMATED COUNT: 13.2 % (ref 11.5–14.5)
ESTIMATED AVERAGE GLUCOSE: 111 MG/DL
FOLATE: >20 NG/ML (ref 4.8–24.2)
GLUCOSE SERPL-MCNC: 102 MG/DL (ref 70–99)
HBA1C MFR BLD: 5.5 % (ref 4–6)
HCT VFR BLD AUTO: 37.6 % (ref 37–47)
HDLC SERPL-MCNC: 59 MG/DL (ref 40–59)
HGB BLD-MCNC: 12.5 G/DL (ref 12–16)
LDLC SERPL CALC-MCNC: 115 MG/DL (ref 0–129)
MCH RBC QN AUTO: 30.5 PG (ref 27–31.3)
MCHC RBC AUTO-ENTMCNC: 33.2 % (ref 33–37)
MCV RBC AUTO: 91.7 FL (ref 79.4–94.8)
PLATELET # BLD AUTO: 183 K/UL (ref 130–400)
POTASSIUM SERPL-SCNC: 3.8 MEQ/L (ref 3.4–4.9)
RBC # BLD AUTO: 4.1 M/UL (ref 4.2–5.4)
SODIUM SERPL-SCNC: 142 MEQ/L (ref 135–144)
TRIGL SERPL-MCNC: 131 MG/DL (ref 0–150)
VITAMIN B-12: 534 PG/ML (ref 232–1245)
WBC # BLD AUTO: 4.3 K/UL (ref 4.8–10.8)

## 2024-09-25 PROCEDURE — 97161 PT EVAL LOW COMPLEX 20 MIN: CPT

## 2024-09-25 PROCEDURE — 36415 COLL VENOUS BLD VENIPUNCTURE: CPT

## 2024-09-25 PROCEDURE — 80048 BASIC METABOLIC PNL TOTAL CA: CPT

## 2024-09-25 PROCEDURE — 97166 OT EVAL MOD COMPLEX 45 MIN: CPT

## 2024-09-25 PROCEDURE — 85027 COMPLETE CBC AUTOMATED: CPT

## 2024-09-25 PROCEDURE — APPSS45 APP SPLIT SHARED TIME 31-45 MINUTES: Performed by: NURSE PRACTITIONER

## 2024-09-25 PROCEDURE — 2580000003 HC RX 258: Performed by: INTERNAL MEDICINE

## 2024-09-25 PROCEDURE — 6370000000 HC RX 637 (ALT 250 FOR IP): Performed by: INTERNAL MEDICINE

## 2024-09-25 PROCEDURE — 99223 1ST HOSP IP/OBS HIGH 75: CPT | Performed by: PSYCHIATRY & NEUROLOGY

## 2024-09-25 PROCEDURE — 83036 HEMOGLOBIN GLYCOSYLATED A1C: CPT

## 2024-09-25 PROCEDURE — 6360000002 HC RX W HCPCS: Performed by: INTERNAL MEDICINE

## 2024-09-25 PROCEDURE — 80061 LIPID PANEL: CPT

## 2024-09-25 RX ORDER — ATORVASTATIN CALCIUM 80 MG/1
80 TABLET, FILM COATED ORAL NIGHTLY
Qty: 30 TABLET | Refills: 3 | Status: SHIPPED | OUTPATIENT
Start: 2024-09-25

## 2024-09-25 RX ORDER — ASPIRIN 81 MG/1
81 TABLET, CHEWABLE ORAL DAILY
Qty: 30 TABLET | Refills: 3 | Status: SHIPPED | OUTPATIENT
Start: 2024-09-26

## 2024-09-25 RX ADMIN — CEFTRIAXONE SODIUM 1000 MG: 1 INJECTION, POWDER, FOR SOLUTION INTRAMUSCULAR; INTRAVENOUS at 14:41

## 2024-09-25 RX ADMIN — ENOXAPARIN SODIUM 40 MG: 100 INJECTION SUBCUTANEOUS at 09:53

## 2024-09-25 RX ADMIN — ASPIRIN 81 MG 81 MG: 81 TABLET ORAL at 09:54

## 2024-09-25 RX ADMIN — Medication 10 ML: at 10:42

## 2024-09-25 RX ADMIN — Medication 100 MG: at 09:53

## 2024-09-25 RX ADMIN — NALTREXONE HYDROCHLORIDE 50 MG: 50 TABLET, FILM COATED ORAL at 09:53

## 2024-09-25 ASSESSMENT — ENCOUNTER SYMPTOMS
TROUBLE SWALLOWING: 0
NAUSEA: 0
WHEEZING: 0
SHORTNESS OF BREATH: 0
CHEST TIGHTNESS: 0
COLOR CHANGE: 0
COUGH: 0
VOMITING: 0

## 2024-09-25 NOTE — PROGRESS NOTES
Met with patient to discuss discharge, went over stroke education and AVS. Patients questions were answered to the best of my ability. Patient gathered her belongings.   Debora Levin RN

## 2024-09-25 NOTE — PLAN OF CARE
Therapy evaluation completed.  Please see daily notes and/or progress notes for details related to planned treatment interventions, goals and functional performance.     Electronically signed by Marissa Franco PT on 9/25/2024 at 10:55 AM

## 2024-09-25 NOTE — CARE COORDINATION
Met with pt at bedside to discuss discharge planning. Pt did well with therapy and recommendations of outpatient therapy at [place], pt agreeable and plans to return home.

## 2024-09-25 NOTE — CONSULTS
the emergency room.  Patient had mild weakness on the right side which was over 24 hours prior to patient arriving.  CT angiogram did not show anything significant.  Vitals:    09/25/24 0718   BP: 103/63   Pulse: 59   Resp: 16   Temp: 97.7 °F (36.5 °C)   SpO2: 96%     Family History   Problem Relation Age of Onset    Stroke Mother     High Blood Pressure Mother     Heart Failure Mother     Colon Cancer Mother     Stroke Father     Prostate Cancer Father     Mult Sclerosis Sister     Diabetes Other      Social History     Socioeconomic History    Marital status: Single     Spouse name: Not on file    Number of children: Not on file    Years of education: Not on file    Highest education level: Not on file   Occupational History    Occupation: Registered Nurse   Tobacco Use    Smoking status: Former    Smokeless tobacco: Never   Substance and Sexual Activity    Alcohol use: Yes    Drug use: Never    Sexual activity: Not on file   Other Topics Concern    Not on file   Social History Narrative    Not on file     Social Determinants of Health     Financial Resource Strain: Not on file   Food Insecurity: Not on file   Transportation Needs: Not on file   Physical Activity: Not on file   Stress: Not on file   Social Connections: Moderately Integrated (9/25/2024)    Social Connections ([facility] HRSN)     If for any reason you need help with day-to-day activities such as bathing, preparing meals, shopping, managing finances, etc., do you get the help you need?: Not on file   Intimate Partner Violence: Not on file   Housing Stability: Not on file          Review of Systems   Constitutional:  Negative for appetite change, chills, fatigue and fever.   HENT:  Negative for hearing loss and trouble swallowing.    Eyes:  Negative for visual disturbance.   Respiratory:  Negative for cough, chest tightness, shortness of breath and wheezing.    Cardiovascular:  Negative for chest pain, palpitations and leg swelling.   Gastrointestinal:   rhythm at 67 bpm.  CT of the head negative for acute intracranial findings.  CTA of the head and neck negative.  MRI of the brain revealed small ischemic infarct in the left paramedian javier from occlusion of a pontine .  Lab testing largely unremarkable.  Urinalysis borderline for UTI with urine culture pending.  TSH, B12 and folate normal.  Total cholesterol 200.  .  Hemoglobin A1c pending.  Echo with bubble study negative.   Patient has been started on aspirin 81 mg daily and high intensity statin    Patient with minimal right sided facial droop and right sided weakness at 4 out of 5.  She is recommended for outpatient therapy.  Will need ongoing attention to blood pressure and cholesterol control.  Patient was advised if symptoms worsen she is return to the emergency room or call 911.  Okay to discharge from neurology standpoint on aspirin 81 mg daily and high intensity statin.  Follow-up 6 weeks.    I have personally performed a face to face diagnostic evaluation on this patient, reviewed all data and investigations, and am the sole provider of all clinical decisions on the neurological status of this patient.  Patient seen and examined case discussed with emergency room and we had admitted her for right facial droop with some right-sided weakness which is improved.  MRI reviewed and shows a  Small pontine stroke in the perforators and has not left which much deficits.  We will though pursue outpatient therapy if needed.  I have though recommended that she come to back to the hospital if she has any worsening as occasionally pontine strokes can swell.  Will continue dual with aspirin for now.  60% time spent on evaluating  pt.    Óscar Steve MD, MARINA  Diplomate, American Board of Psychiatry & Neurology  Board Certified in Vascular Neurology  Board Certified in Neuromuscular Medicine  Certified in Neurorehabilitation           Collaborating physicians: Dr Steve    Electronically signed by

## 2024-09-25 NOTE — PROGRESS NOTES
Physical Therapy Med Surg Initial Assessment  Facility/Department: [address] ORTHO TELE  Room: [place] [id number]       NAME: Sharon Celis  : 1955 (68 y.o.)  MRN: [id number]  CODE STATUS: Full Code    Date of Service: 2024    Patient Diagnosis(es): Acute right-sided weakness [R53.1]  Cerebrovascular accident (CVA) due to embolism of precerebral artery (HCC) [I63.10]  Acute cerebrovascular accident (CVA) (HCC) [I63.9]  Cerebrovascular accident (CVA), unspecified mechanism (HCC) [I63.9]   Chief Complaint   Patient presents with    Cerebrovascular Accident     States she slept in yesterday and her right side felt weird and she brushed it off today when she woke up she said her right arm will not do what she wants to do and her gait is off. Pt also has right sided facial droop.      Patient Active Problem List    Diagnosis Date Noted    Acute cerebrovascular accident (CVA) (HCC) 2024    Syncope and collapse 2021    Right fibular fracture 2021    Elevated liver enzymes 2021    Abdominal pain 2021    NATHANIEL (acute kidney injury) (HCC) 2021    Patient overweight 2012    Fatigue 2012    Hep C w/o coma, chronic (HCC) 2012    Hepatitis C 2011    Essential hypertension, benign 2011    Esophageal reflux 2011        Past Medical History:   Diagnosis Date    GERD (gastroesophageal reflux disease)     Hypertension     Other specified episodic mood disorder     Unspecified viral hepatitis C without hepatic coma      Past Surgical History:   Procedure Laterality Date    CHOLECYSTECTOMY      SKIN GRAFT      left arm/elbow       Chart Reviewed: Yes  Patient assessed for rehabilitation services?: Yes    Restrictions:  Restrictions/Precautions: Up as Tolerated     SUBJECTIVE: Subjective: Patient is agreeable to PT evaluation.    Pain   0/10    Post Treatment Pain Screenin/10    Prior Level of Function:  Social/Functional History  Lives With: Son, Other  (comment) (& HIS PARTNER)  Type of Home: House  Home Layout: Two level, Bed/Bath upstairs, Able to Live on Main level with bedroom/bathroom  Home Access: Stairs to enter without rails  Entrance Stairs - Number of Steps: 3  Bathroom Shower/Tub: Tub/Shower unit, Walk-in shower (Walk in on first floor)  Bathroom Equipment: Shower chair, Hand-held shower  Home Equipment: Walker - Rolling  Has the patient had two or more falls in the past year or any fall with injury in the past year?: No  ADL Assistance: Independent  Homemaking Assistance: Independent  Ambulation Assistance: Independent (No AD)  Transfer Assistance: Independent  Active : Yes  Mode of Transportation: Car  Occupation: Retired, Part time employment  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  Leisure & Hobbies: TV    OBJECTIVE:   Hearing: Within functional limits    Cognition:  Overall Orientation Status: Within Functional Limits  Follows Commands: Within Functional Limits    Observation/Palpation  Posture: Good  Observation: Pt alert and attentive, no acute distress, agreeable to therapy assessment, seated in bedside armchair upon arrival    ROM:  RLE AROM: WNL  LLE AROM : WNL    Strength:  Strength RLE  Strength RLE: WFL  Comment: slight decreased in overall right LE strength as compared to left, however, still WFL  Strength LLE  Strength LLE: WNL    Bed mobility  Supine to Sit: Unable to assess  Sit to Supine: Unable to assess  Bed Mobility Comments: Pt up in chair at start and end of tx; denies difficulty    Transfers  Sit to Stand: Independent  Stand to Sit: Independent  Bed to Chair: Independent    Ambulation  Surface: Level tile  Device: No Device  Assistance: Modified Independent  Gait Deviations: Slow Krissy;Increased SILVIA;Decreased step length;Decreased step height  Distance: 150'  Comments: slightly unsteady    Stairs/Curb  Stairs?: Yes  Stairs  # Steps : 3  Stairs Height: 6\"  Rails:

## 2024-09-25 NOTE — DISCHARGE SUMMARY
Hospital Medicine Discharge Summary    Sharon Celis  :  1955  MRN:  [id number]    Admit date:  2024  Discharge date:  2024    Admitting Physician:  Kevin Nettles MD  Primary Care Physician:  Lit Fraga,       Discharge Diagnoses:    CVA    Chief Complaint   Patient presents with    Cerebrovascular Accident     States she slept in yesterday and her right side felt weird and she brushed it off today when she woke up she said her right arm will not do what she wants to do and her gait is off. Pt also has right sided facial droop.      Hospital Course:   Patient presented with a CVA.  After workup was evaluated by neuro who recommended d/c on asa and statin.  Exam on discharge:   /73 Comment: Debora WILLETT notified.  Pulse 67   Temp 97.5 °F (36.4 °C) (Oral)   Resp 16   Ht 1.727 m (5' 7.99\")   Wt 81.6 kg (180 lb)   LMP  (LMP Unknown)   SpO2 97%   BMI 27.38 kg/m²   General appearance: alert, appears stated age and cooperative  Skin:  No rashes or lesions on exposed skin  HEENT: Head: Normal, normocephalic, atraumatic..   Neck: trachea midline  Lungs: Clear bilateral breath sounds  Heart: RRR  Abdomen: Non tender  Extremities: No peripheral edema  Neurologic: right upper extremity 4/5 strength     Patient was seen by the following consultants   Consults:  IP CONSULT TO NEUROLOGY  IP CONSULT TO NEUROLOGY  IP CONSULT TO CASE MANAGEMENT  IP CONSULT TO DIETITIAN  IP CONSULT TO SOCIAL WORK    Significant Diagnostic Studies:    Refer to chart     Please refer to chart if no studies are shown here    Vascular duplex carotid bilateral    Result Date: 2024  EXAMINATION: ULTRASOUND EVALUATION OF THE CAROTID ARTERIES 2024 TECHNIQUE: Duplex ultrasound using B-mode/gray scaled imaging, Doppler spectral analysis and color flow Doppler was obtained of the carotid arteries. COMPARISON: Neck CTA 2024 HISTORY: ORDERING SYSTEM PROVIDED HISTORY: CVA FINDINGS: RIGHT: The right common  for review.  MIP images are provided for review. Automated exposure control, iterative reconstruction, and/or weight based adjustment of the mA/kV was utilized to reduce the radiation dose to as low as reasonably achievable. COMPARISON: None HISTORY: ORDERING SYSTEM PROVIDED HISTORY: right side weakness TECHNOLOGIST PROVIDED HISTORY: Reason for exam:->right side weakness Additional Contrast?->None What reading provider will be dictating this exam?->CRC Initial evaluation. FINDINGS: ANTERIOR CIRCULATION: Minimal atherosclerosis seen of the internal carotid arteries bilaterally.  No significant stenosis is seen of the internal carotid arteries.  No significant stenosis of the anterior cerebral or middle cerebral arteries.  No aneurysm identified. POSTERIOR CIRCULATION: No significant stenosis of the vertebral, basilar, or posterior cerebral arteries. No aneurysm. OTHER: No dural venous sinus thrombosis on this non-dedicated study.     No significant stenosis or large vessel occlusion of the [id number].     CT Head W/O Contrast    Result Date: 9/24/2024  EXAMINATION: CT OF THE HEAD WITHOUT CONTRAST  9/24/2024 8:28 am TECHNIQUE: CT of the head was performed without the administration of intravenous contrast. Automated exposure control, iterative reconstruction, and/or weight based adjustment of the mA/kV was utilized to reduce the radiation dose to as low as reasonably achievable. COMPARISON: None available due to technical factors HISTORY: ORDERING SYSTEM PROVIDED HISTORY: right side weakness, woke up yesterday 11 am with symptoms TECHNOLOGIST PROVIDED HISTORY: Reason for exam:->right side weakness, woke up yesterday 11 am with symptoms Has a \"code stroke\" or \"stroke alert\" been called?->No Decision Support Exception - unselect if not a suspected or confirmed emergency medical condition->Emergency Medical Condition (MA) What reading provider will be dictating this exam?->CRC FINDINGS: BRAIN/VENTRICLES: There is  Analgesia was given

## 2024-09-25 NOTE — PROGRESS NOTES
Modified independent   UE Bathing: Modified independent   LE Bathing: Modified independent   UE Dressing: Modified independent   LE Dressing: Modified independent   Toileting: Modified independent   Additional Comments: Simulated ADLs as above. Pt. with no significant LOB or difficulty. G safety awareness and attention to mild balance deficits.  Toilet Transfers  Toilet Transfer: Unable to assess  Toilet Transfers Comments: Declines need, denies difficulty    Functional Mobility:    Transfers  Sit to stand: Modified independent  Stand to sit: Modified independent    Patient ambulated household distance in hallway with No device at Independent level. No difficulty. Mild difficulty coming down 3 steps; pt's son able to assist per pt.     Bed Mobility  Bed mobility  Supine to Sit: Unable to assess  Sit to Supine: Unable to assess  Bed Mobility Comments: Pt up in chair at start and end of tx; denies difficulty    Seated and Standing Balance:  Balance  Sitting: Intact  Standing: Intact    Functional Endurance:  Activity Tolerance  Activity Tolerance: Patient Tolerated treatment well    D/C Recommendations:  OT D/C RECOMMENDATIONS  REQUIRES OT FOLLOW-UP: No    Equipment Recommendations:  OT Equipment Recommendations  Equipment Needed: No    OT Education:   Patient Education  Education Given To: Patient  Education Provided: Role of Therapy;Plan of Care  Education Method: Verbal  Barriers to Learning: None  Education Outcome: Verbalized understanding    OT Follow Up:   OT D/C RECOMMENDATIONS  REQUIRES OT FOLLOW-UP: No       Assessment/Discharge Disposition:  Assessment: Pt is a 68 year old woman from home who presents to [facility] with acute CVA. She demonstrates no significant deficits and reports feeling close to baseline. No acute OT needs identified.  Prognosis: Good  No Skilled OT: Independent with ADL's, Safe to return home  Decision Making: Low Complexity     History: Pt's medical history is moderately complex  Exam: Pt

## 2024-09-25 NOTE — PLAN OF CARE
See OT evaluation for all goals and OT POC. Electronically signed by ERIN Trammell/L on 9/25/2024 at 10:39 AM

## 2024-09-26 NOTE — PROGRESS NOTES
Physical Therapy  Facility/Department: [facility] MED SURG W267/W267-01  Physical Therapy Discharge      NAME: Sharon Celis    : 1955 (68 y.o.)  MRN: [id number]    Account: [id number]  Gender: female      Patient has been discharged from acute care hospital. DC patient from current PT program.      Electronically signed by Any Rivas PT on 24 at 2:06 PM EDT

## 2025-01-28 ENCOUNTER — OFFICE VISIT (OUTPATIENT)
Dept: NEUROLOGY | Age: 70
End: 2025-01-28
Payer: MEDICARE

## 2025-01-28 VITALS — WEIGHT: 173 LBS | BODY MASS INDEX: 26.31 KG/M2 | SYSTOLIC BLOOD PRESSURE: 122 MMHG | DIASTOLIC BLOOD PRESSURE: 80 MMHG

## 2025-01-28 DIAGNOSIS — I63.9 LEFT PONTINE CVA (HCC): Primary | ICD-10-CM

## 2025-01-28 DIAGNOSIS — R53.1 WEAKNESS: ICD-10-CM

## 2025-01-28 PROBLEM — S82.899A FRACTURE OF ANKLE: Status: ACTIVE | Noted: 2025-01-28

## 2025-01-28 PROBLEM — M25.579 ANKLE PAIN: Status: ACTIVE | Noted: 2025-01-28

## 2025-01-28 PROBLEM — S05.90XA BLUNT TRAUMA OF EYE: Status: ACTIVE | Noted: 2025-01-28

## 2025-01-28 PROBLEM — I10 HYPERTENSION: Status: ACTIVE | Noted: 2025-01-28

## 2025-01-28 PROBLEM — H26.492 AFTER-CATARACT OBSCURING VISION, LEFT: Status: ACTIVE | Noted: 2019-05-07

## 2025-01-28 PROBLEM — H43.813 VITREOUS DEGENERATION, BILATERAL: Status: ACTIVE | Noted: 2019-05-07

## 2025-01-28 PROBLEM — H57.00: Status: ACTIVE | Noted: 2025-01-28
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
## 2025-01-28 PROBLEM — F98.8 ATTENTION DEFICIT DISORDER (ADD) WITHOUT HYPERACTIVITY: Status: ACTIVE | Noted: 2020-06-16

## 2025-01-28 PROBLEM — S05.8X1A COMMOTIO RETINAE OF RIGHT EYE: Status: ACTIVE | Noted: 2025-01-28

## 2025-01-28 PROBLEM — H18.239 SECONDARY CORNEAL EDEMA: Status: ACTIVE | Noted: 2025-01-28

## 2025-01-28 PROBLEM — Z96.1 PSEUDOPHAKIA OF BOTH EYES: Status: ACTIVE | Noted: 2019-05-07

## 2025-01-28 PROBLEM — H43.813 PVD (POSTERIOR VITREOUS DETACHMENT), BOTH EYES: Status: ACTIVE | Noted: 2018-06-20

## 2025-01-28 PROBLEM — H33.319 U SHAPED RETINAL TEAR: Status: ACTIVE | Noted: 2025-01-28

## 2025-01-28 PROCEDURE — G8427 DOCREV CUR MEDS BY ELIG CLIN: HCPCS | Performed by: PSYCHIATRY & NEUROLOGY

## 2025-01-28 PROCEDURE — 3074F SYST BP LT 130 MM HG: CPT | Performed by: PSYCHIATRY & NEUROLOGY

## 2025-01-28 PROCEDURE — 3079F DIAST BP 80-89 MM HG: CPT | Performed by: PSYCHIATRY & NEUROLOGY

## 2025-01-28 PROCEDURE — G8419 CALC BMI OUT NRM PARAM NOF/U: HCPCS | Performed by: PSYCHIATRY & NEUROLOGY

## 2025-01-28 PROCEDURE — 1123F ACP DISCUSS/DSCN MKR DOCD: CPT | Performed by: PSYCHIATRY & NEUROLOGY

## 2025-01-28 PROCEDURE — 1090F PRES/ABSN URINE INCON ASSESS: CPT | Performed by: PSYCHIATRY & NEUROLOGY

## 2025-01-28 PROCEDURE — 99214 OFFICE O/P EST MOD 30 MIN: CPT | Performed by: PSYCHIATRY & NEUROLOGY

## 2025-01-28 PROCEDURE — G8400 PT W/DXA NO RESULTS DOC: HCPCS | Performed by: PSYCHIATRY & NEUROLOGY

## 2025-01-28 PROCEDURE — 1036F TOBACCO NON-USER: CPT | Performed by: PSYCHIATRY & NEUROLOGY

## 2025-01-28 PROCEDURE — 3017F COLORECTAL CA SCREEN DOC REV: CPT | Performed by: PSYCHIATRY & NEUROLOGY

## 2025-01-28 PROCEDURE — 1159F MED LIST DOCD IN RCRD: CPT | Performed by: PSYCHIATRY & NEUROLOGY

## 2025-01-28 PROCEDURE — 1126F AMNT PAIN NOTED NONE PRSNT: CPT | Performed by: PSYCHIATRY & NEUROLOGY

## 2025-01-28 RX ORDER — CLOPIDOGREL BISULFATE 75 MG/1
75 TABLET ORAL DAILY
Qty: 30 TABLET | Refills: 3 | Status: SHIPPED | OUTPATIENT
Start: 2025-01-28

## 2025-01-28 RX ORDER — AMLODIPINE BESYLATE 2.5 MG/1
2.5 TABLET ORAL DAILY
COMMUNITY
Start: 2024-11-17

## 2025-01-28 ASSESSMENT — ENCOUNTER SYMPTOMS
BACK PAIN: 0
SHORTNESS OF BREATH: 0
COLOR CHANGE: 0
CHOKING: 0
PHOTOPHOBIA: 0
NAUSEA: 0
VOMITING: 0
TROUBLE SWALLOWING: 0

## 2025-01-28 NOTE — PROGRESS NOTES
periventricular and subcortical white matter, which likely represent chronic microvascular ischemic change. ORBITS: The visualized portion of the orbits demonstrate no acute abnormality. SINUSES: No air fluid level SOFT TISSUES/SKULL: No acute abnormality of the visualized skull or soft tissues.     No acute intracranial abnormality. Mild chronic senescent change     XR CHEST PORTABLE    Result Date: 9/24/2024  EXAMINATION: ONE XRAY VIEW OF THE CHEST 9/24/2024 8:08 am COMPARISON: August 22, 2021 HISTORY: ORDERING SYSTEM PROVIDED HISTORY: right sided weakness TECHNOLOGIST PROVIDED HISTORY: Reason for exam:->right sided weakness What reading provider will be dictating this exam?->CRC FINDINGS: No consolidating pneumonia, pneumothorax or pleural effusion is seen.  The cardiac silhouette is within normal limits.  The bony structures are grossly intact.     No acute cardiopulmonary process       Lab Results   Component Value Date/Time    WBC 4.3 09/25/2024 04:57 AM    RBC 4.10 09/25/2024 04:57 AM    RBC 4.66 12/09/2011 02:11 PM    HGB 12.5 09/25/2024 04:57 AM    HCT 37.6 09/25/2024 04:57 AM    MCV 91.7 09/25/2024 04:57 AM    MCH 30.5 09/25/2024 04:57 AM    MCHC 33.2 09/25/2024 04:57 AM    RDW 13.2 09/25/2024 04:57 AM     09/25/2024 04:57 AM    MPV 9.8 06/27/2014 04:47 PM     Lab Results   Component Value Date/Time     09/25/2024 04:57 AM    K 3.8 09/25/2024 04:57 AM     09/25/2024 04:57 AM    CO2 27 09/25/2024 04:57 AM    BUN 20 09/25/2024 04:57 AM    CREATININE 0.88 09/25/2024 04:57 AM    GFRAA 42.8 08/23/2021 03:50 AM    LABGLOM 71.5 09/25/2024 04:57 AM    GLUCOSE 102 09/25/2024 04:57 AM    GLUCOSE 95 12/09/2011 02:11 PM    CALCIUM 9.2 09/25/2024 04:57 AM    BILITOT <0.2 09/24/2024 08:04 AM    ALKPHOS 75 09/24/2024 08:04 AM    AST 20 09/24/2024 08:04 AM    ALT 16 09/24/2024 08:04 AM     Lab Results   Component Value Date/Time    PROTIME 13.0 09/24/2024 08:03 AM    INR 1.0 09/24/2024 08:03 AM     Lab